# Patient Record
Sex: MALE | Race: BLACK OR AFRICAN AMERICAN | Employment: FULL TIME | URBAN - METROPOLITAN AREA
[De-identification: names, ages, dates, MRNs, and addresses within clinical notes are randomized per-mention and may not be internally consistent; named-entity substitution may affect disease eponyms.]

---

## 2019-09-04 PROBLEM — N52.9 MALE ERECTILE DYSFUNCTION, UNSPECIFIED: Status: ACTIVE | Noted: 2019-09-04

## 2020-12-23 ENCOUNTER — HOSPITAL ENCOUNTER (INPATIENT)
Age: 55
LOS: 4 days | Discharge: HOME | DRG: 177 | End: 2020-12-28
Attending: EMERGENCY MEDICINE | Admitting: FAMILY MEDICINE
Payer: COMMERCIAL

## 2020-12-23 ENCOUNTER — APPOINTMENT (OUTPATIENT)
Dept: GENERAL RADIOLOGY | Age: 55
DRG: 177 | End: 2020-12-23
Attending: EMERGENCY MEDICINE
Payer: COMMERCIAL

## 2020-12-23 DIAGNOSIS — U07.1 COVID-19 VIRUS INFECTION: Primary | ICD-10-CM

## 2020-12-23 DIAGNOSIS — R09.02 HYPOXIA: ICD-10-CM

## 2020-12-23 LAB
ALBUMIN SERPL-MCNC: 3.8 G/DL (ref 3.4–5)
ALBUMIN/GLOB SERPL: 0.9 {RATIO} (ref 0.8–1.7)
ALP SERPL-CCNC: 61 U/L (ref 45–117)
ALT SERPL-CCNC: 37 U/L (ref 16–61)
ANION GAP SERPL CALC-SCNC: 9 MMOL/L (ref 3–18)
APTT PPP: 32.6 SEC (ref 23–36.4)
ARTERIAL PATENCY WRIST A: YES
AST SERPL-CCNC: 41 U/L (ref 10–38)
BASE DEFICIT BLD-SCNC: 2 MMOL/L
BASOPHILS # BLD: 0 K/UL (ref 0–0.1)
BASOPHILS NFR BLD: 0 % (ref 0–2)
BDY SITE: ABNORMAL
BILIRUB SERPL-MCNC: 0.6 MG/DL (ref 0.2–1)
BNP SERPL-MCNC: 15 PG/ML (ref 0–900)
BODY TEMPERATURE: 103.2
BUN SERPL-MCNC: 17 MG/DL (ref 7–18)
BUN/CREAT SERPL: 13 (ref 12–20)
CALCIUM SERPL-MCNC: 8.7 MG/DL (ref 8.5–10.1)
CHLORIDE SERPL-SCNC: 103 MMOL/L (ref 100–111)
CO2 SERPL-SCNC: 25 MMOL/L (ref 21–32)
CREAT SERPL-MCNC: 1.35 MG/DL (ref 0.6–1.3)
DIFFERENTIAL METHOD BLD: NORMAL
EOSINOPHIL # BLD: 0 K/UL (ref 0–0.4)
EOSINOPHIL NFR BLD: 0 % (ref 0–5)
ERYTHROCYTE [DISTWIDTH] IN BLOOD BY AUTOMATED COUNT: 13.2 % (ref 11.6–14.5)
FIBRINOGEN PPP-MCNC: 481 MG/DL (ref 210–451)
GAS FLOW.O2 O2 DELIVERY SYS: ABNORMAL L/MIN
GLOBULIN SER CALC-MCNC: 4.3 G/DL (ref 2–4)
GLUCOSE SERPL-MCNC: 103 MG/DL (ref 74–99)
HCO3 BLD-SCNC: 22.6 MMOL/L (ref 22–26)
HCT VFR BLD AUTO: 42 % (ref 36–48)
HGB BLD-MCNC: 15.1 G/DL (ref 13–16)
INR PPP: 1 (ref 0.8–1.2)
LACTATE BLD-SCNC: 0.8 MMOL/L (ref 0.4–2)
LYMPHOCYTES # BLD: 2 K/UL (ref 0.9–3.6)
LYMPHOCYTES NFR BLD: 26 % (ref 21–52)
MAGNESIUM SERPL-MCNC: 2.2 MG/DL (ref 1.6–2.6)
MCH RBC QN AUTO: 31.3 PG (ref 24–34)
MCHC RBC AUTO-ENTMCNC: 36 G/DL (ref 31–37)
MCV RBC AUTO: 87.1 FL (ref 74–97)
MONOCYTES # BLD: 0.5 K/UL (ref 0.05–1.2)
MONOCYTES NFR BLD: 6 % (ref 3–10)
NEUTS SEG # BLD: 5.3 K/UL (ref 1.8–8)
NEUTS SEG NFR BLD: 68 % (ref 40–73)
O2/TOTAL GAS SETTING VFR VENT: 0.21 %
PCO2 BLD: 38.8 MMHG (ref 35–45)
PH BLD: 7.38 [PH] (ref 7.35–7.45)
PLATELET # BLD AUTO: 165 K/UL (ref 135–420)
PMV BLD AUTO: 10 FL (ref 9.2–11.8)
PO2 BLD: 74 MMHG (ref 80–100)
POTASSIUM SERPL-SCNC: 3.9 MMOL/L (ref 3.5–5.5)
PROT SERPL-MCNC: 8.1 G/DL (ref 6.4–8.2)
PROTHROMBIN TIME: 13.4 SEC (ref 11.5–15.2)
RBC # BLD AUTO: 4.82 M/UL (ref 4.7–5.5)
SAO2 % BLD: 92 % (ref 92–97)
SERVICE CMNT-IMP: ABNORMAL
SODIUM SERPL-SCNC: 137 MMOL/L (ref 136–145)
SPECIMEN TYPE: ABNORMAL
TOTAL RESP. RATE, ITRR: 28
WBC # BLD AUTO: 7.8 K/UL (ref 4.6–13.2)

## 2020-12-23 PROCEDURE — 82803 BLOOD GASES ANY COMBINATION: CPT

## 2020-12-23 PROCEDURE — 83605 ASSAY OF LACTIC ACID: CPT

## 2020-12-23 PROCEDURE — 85384 FIBRINOGEN ACTIVITY: CPT

## 2020-12-23 PROCEDURE — 83880 ASSAY OF NATRIURETIC PEPTIDE: CPT

## 2020-12-23 PROCEDURE — 96374 THER/PROPH/DIAG INJ IV PUSH: CPT

## 2020-12-23 PROCEDURE — 71045 X-RAY EXAM CHEST 1 VIEW: CPT

## 2020-12-23 PROCEDURE — 85610 PROTHROMBIN TIME: CPT

## 2020-12-23 PROCEDURE — 74011000250 HC RX REV CODE- 250: Performed by: EMERGENCY MEDICINE

## 2020-12-23 PROCEDURE — 99285 EMERGENCY DEPT VISIT HI MDM: CPT

## 2020-12-23 PROCEDURE — 85730 THROMBOPLASTIN TIME PARTIAL: CPT

## 2020-12-23 PROCEDURE — 83735 ASSAY OF MAGNESIUM: CPT

## 2020-12-23 PROCEDURE — 87040 BLOOD CULTURE FOR BACTERIA: CPT

## 2020-12-23 PROCEDURE — 80053 COMPREHEN METABOLIC PANEL: CPT

## 2020-12-23 PROCEDURE — 85025 COMPLETE CBC W/AUTO DIFF WBC: CPT

## 2020-12-23 PROCEDURE — 96375 TX/PRO/DX INJ NEW DRUG ADDON: CPT

## 2020-12-23 PROCEDURE — 83036 HEMOGLOBIN GLYCOSYLATED A1C: CPT

## 2020-12-23 PROCEDURE — 36600 WITHDRAWAL OF ARTERIAL BLOOD: CPT

## 2020-12-23 PROCEDURE — 93005 ELECTROCARDIOGRAM TRACING: CPT

## 2020-12-23 PROCEDURE — 74011250636 HC RX REV CODE- 250/636: Performed by: EMERGENCY MEDICINE

## 2020-12-23 PROCEDURE — 74011250637 HC RX REV CODE- 250/637: Performed by: EMERGENCY MEDICINE

## 2020-12-23 RX ORDER — ACETAMINOPHEN 500 MG
1000 TABLET ORAL ONCE
Status: COMPLETED | OUTPATIENT
Start: 2020-12-23 | End: 2020-12-23

## 2020-12-23 RX ORDER — SODIUM CHLORIDE 0.9 % (FLUSH) 0.9 %
5-10 SYRINGE (ML) INJECTION AS NEEDED
Status: DISCONTINUED | OUTPATIENT
Start: 2020-12-23 | End: 2020-12-28 | Stop reason: HOSPADM

## 2020-12-23 RX ORDER — DEXAMETHASONE SODIUM PHOSPHATE 10 MG/ML
10 INJECTION INTRAMUSCULAR; INTRAVENOUS
Status: COMPLETED | OUTPATIENT
Start: 2020-12-23 | End: 2020-12-23

## 2020-12-23 RX ORDER — DEXAMETHASONE SODIUM PHOSPHATE 100 MG/10ML
10 INJECTION INTRAMUSCULAR; INTRAVENOUS
Status: DISCONTINUED | OUTPATIENT
Start: 2020-12-23 | End: 2020-12-23

## 2020-12-23 RX ADMIN — AZITHROMYCIN MONOHYDRATE 500 MG: 500 INJECTION, POWDER, LYOPHILIZED, FOR SOLUTION INTRAVENOUS at 21:44

## 2020-12-23 RX ADMIN — ACETAMINOPHEN 1000 MG: 500 TABLET ORAL at 23:01

## 2020-12-23 RX ADMIN — SODIUM CHLORIDE 1000 ML: 900 INJECTION, SOLUTION INTRAVENOUS at 21:37

## 2020-12-23 RX ADMIN — DEXAMETHASONE SODIUM PHOSPHATE 10 MG: 10 INJECTION, SOLUTION INTRAMUSCULAR; INTRAVENOUS at 21:37

## 2020-12-23 RX ADMIN — WATER 2 G: 1 INJECTION INTRAMUSCULAR; INTRAVENOUS; SUBCUTANEOUS at 21:39

## 2020-12-24 PROBLEM — U07.1 COVID-19 VIRUS INFECTION: Status: ACTIVE | Noted: 2020-12-24

## 2020-12-24 PROBLEM — R09.02 HYPOXIA: Status: ACTIVE | Noted: 2020-12-24

## 2020-12-24 PROBLEM — E66.9 OBESITY (BMI 30-39.9): Status: ACTIVE | Noted: 2020-12-24

## 2020-12-24 LAB
25(OH)D3 SERPL-MCNC: 8.1 NG/ML (ref 30–100)
ABO + RH BLD: NORMAL
APTT PPP: 30.8 SEC (ref 23–36.4)
BLOOD GROUP ANTIBODIES SERPL: NORMAL
D DIMER PPP FEU-MCNC: 0.39 UG/ML(FEU)
FIBRINOGEN PPP-MCNC: 407 MG/DL (ref 210–451)
HBA1C MFR BLD: 6 % (ref 4.2–5.6)
INR PPP: 1.1 (ref 0.8–1.2)
L PNEUMO AG UR QL IA: NEGATIVE
MAGNESIUM SERPL-MCNC: 2.3 MG/DL (ref 1.6–2.6)
PROCALCITONIN SERPL-MCNC: 0.1 NG/ML
PROTHROMBIN TIME: 13.9 SEC (ref 11.5–15.2)
S PNEUM AG UR QL: NEGATIVE
SPECIMEN EXP DATE BLD: NORMAL
TROPONIN I SERPL-MCNC: <0.02 NG/ML (ref 0–0.04)

## 2020-12-24 PROCEDURE — 85384 FIBRINOGEN ACTIVITY: CPT

## 2020-12-24 PROCEDURE — 85610 PROTHROMBIN TIME: CPT

## 2020-12-24 PROCEDURE — 84484 ASSAY OF TROPONIN QUANT: CPT

## 2020-12-24 PROCEDURE — 82306 VITAMIN D 25 HYDROXY: CPT

## 2020-12-24 PROCEDURE — 85730 THROMBOPLASTIN TIME PARTIAL: CPT

## 2020-12-24 PROCEDURE — 36430 TRANSFUSION BLD/BLD COMPNT: CPT

## 2020-12-24 PROCEDURE — 74011250637 HC RX REV CODE- 250/637: Performed by: FAMILY MEDICINE

## 2020-12-24 PROCEDURE — 74011000258 HC RX REV CODE- 258: Performed by: INTERNAL MEDICINE

## 2020-12-24 PROCEDURE — 87449 NOS EACH ORGANISM AG IA: CPT

## 2020-12-24 PROCEDURE — XW033E5 INTRODUCTION OF REMDESIVIR ANTI-INFECTIVE INTO PERIPHERAL VEIN, PERCUTANEOUS APPROACH, NEW TECHNOLOGY GROUP 5: ICD-10-PCS | Performed by: INTERNAL MEDICINE

## 2020-12-24 PROCEDURE — 77010033678 HC OXYGEN DAILY

## 2020-12-24 PROCEDURE — 65660000000 HC RM CCU STEPDOWN

## 2020-12-24 PROCEDURE — 74011250636 HC RX REV CODE- 250/636: Performed by: FAMILY MEDICINE

## 2020-12-24 PROCEDURE — 85379 FIBRIN DEGRADATION QUANT: CPT

## 2020-12-24 PROCEDURE — 74011000250 HC RX REV CODE- 250: Performed by: INTERNAL MEDICINE

## 2020-12-24 PROCEDURE — XW13325 TRANSFUSION OF CONVALESCENT PLASMA (NONAUTOLOGOUS) INTO PERIPHERAL VEIN, PERCUTANEOUS APPROACH, NEW TECHNOLOGY GROUP 5: ICD-10-PCS | Performed by: FAMILY MEDICINE

## 2020-12-24 PROCEDURE — 84145 PROCALCITONIN (PCT): CPT

## 2020-12-24 PROCEDURE — 83735 ASSAY OF MAGNESIUM: CPT

## 2020-12-24 PROCEDURE — 86901 BLOOD TYPING SEROLOGIC RH(D): CPT

## 2020-12-24 RX ORDER — ACETAMINOPHEN 650 MG/1
650 SUPPOSITORY RECTAL
Status: DISCONTINUED | OUTPATIENT
Start: 2020-12-24 | End: 2020-12-28 | Stop reason: HOSPADM

## 2020-12-24 RX ORDER — ACETAMINOPHEN 325 MG/1
650 TABLET ORAL
Status: DISCONTINUED | OUTPATIENT
Start: 2020-12-24 | End: 2020-12-28 | Stop reason: HOSPADM

## 2020-12-24 RX ORDER — SODIUM CHLORIDE 0.9 % (FLUSH) 0.9 %
5-40 SYRINGE (ML) INJECTION AS NEEDED
Status: DISCONTINUED | OUTPATIENT
Start: 2020-12-24 | End: 2020-12-28 | Stop reason: HOSPADM

## 2020-12-24 RX ORDER — POLYETHYLENE GLYCOL 3350 17 G/17G
17 POWDER, FOR SOLUTION ORAL DAILY PRN
Status: DISCONTINUED | OUTPATIENT
Start: 2020-12-24 | End: 2020-12-28 | Stop reason: HOSPADM

## 2020-12-24 RX ORDER — SODIUM CHLORIDE 9 MG/ML
250 INJECTION, SOLUTION INTRAVENOUS AS NEEDED
Status: DISCONTINUED | OUTPATIENT
Start: 2020-12-24 | End: 2020-12-28 | Stop reason: HOSPADM

## 2020-12-24 RX ORDER — GUAIFENESIN 100 MG/5ML
81 LIQUID (ML) ORAL DAILY
Status: DISCONTINUED | OUTPATIENT
Start: 2020-12-24 | End: 2020-12-28 | Stop reason: HOSPADM

## 2020-12-24 RX ORDER — GUAIFENESIN/DEXTROMETHORPHAN 100-10MG/5
5 SYRUP ORAL
Status: DISCONTINUED | OUTPATIENT
Start: 2020-12-24 | End: 2020-12-28 | Stop reason: HOSPADM

## 2020-12-24 RX ORDER — DEXAMETHASONE 4 MG/1
6 TABLET ORAL DAILY
Status: DISCONTINUED | OUTPATIENT
Start: 2020-12-24 | End: 2020-12-28 | Stop reason: HOSPADM

## 2020-12-24 RX ORDER — PROMETHAZINE HYDROCHLORIDE 25 MG/1
12.5 TABLET ORAL
Status: DISCONTINUED | OUTPATIENT
Start: 2020-12-24 | End: 2020-12-28 | Stop reason: HOSPADM

## 2020-12-24 RX ORDER — ENOXAPARIN SODIUM 100 MG/ML
30 INJECTION SUBCUTANEOUS EVERY 12 HOURS
Status: DISCONTINUED | OUTPATIENT
Start: 2020-12-24 | End: 2020-12-28 | Stop reason: HOSPADM

## 2020-12-24 RX ORDER — CHOLECALCIFEROL (VITAMIN D3) 125 MCG
10 CAPSULE ORAL
Status: DISCONTINUED | OUTPATIENT
Start: 2020-12-24 | End: 2020-12-28 | Stop reason: HOSPADM

## 2020-12-24 RX ORDER — ZINC SULFATE 50(220)MG
1 CAPSULE ORAL EVERY 12 HOURS
Status: DISCONTINUED | OUTPATIENT
Start: 2020-12-24 | End: 2020-12-28 | Stop reason: HOSPADM

## 2020-12-24 RX ORDER — ASCORBIC ACID 250 MG
500 TABLET ORAL 2 TIMES DAILY
Status: DISCONTINUED | OUTPATIENT
Start: 2020-12-24 | End: 2020-12-28 | Stop reason: HOSPADM

## 2020-12-24 RX ORDER — SODIUM CHLORIDE 0.9 % (FLUSH) 0.9 %
5-40 SYRINGE (ML) INJECTION EVERY 8 HOURS
Status: DISCONTINUED | OUTPATIENT
Start: 2020-12-24 | End: 2020-12-28 | Stop reason: HOSPADM

## 2020-12-24 RX ORDER — MELATONIN
6000 DAILY
Status: DISCONTINUED | OUTPATIENT
Start: 2020-12-24 | End: 2020-12-28 | Stop reason: HOSPADM

## 2020-12-24 RX ORDER — ONDANSETRON 2 MG/ML
4 INJECTION INTRAMUSCULAR; INTRAVENOUS
Status: DISCONTINUED | OUTPATIENT
Start: 2020-12-24 | End: 2020-12-28 | Stop reason: HOSPADM

## 2020-12-24 RX ORDER — FAMOTIDINE 20 MG/1
20 TABLET, FILM COATED ORAL DAILY
Status: DISCONTINUED | OUTPATIENT
Start: 2020-12-24 | End: 2020-12-28 | Stop reason: HOSPADM

## 2020-12-24 RX ADMIN — Medication 10 ML: at 22:58

## 2020-12-24 RX ADMIN — REMDESIVIR 200 MG: 100 INJECTION, POWDER, LYOPHILIZED, FOR SOLUTION INTRAVENOUS at 12:04

## 2020-12-24 RX ADMIN — Medication 5 ML: at 14:00

## 2020-12-24 RX ADMIN — Medication 10 MG: at 22:57

## 2020-12-24 RX ADMIN — ACETAMINOPHEN 650 MG: 325 TABLET ORAL at 23:12

## 2020-12-24 RX ADMIN — DEXAMETHASONE 6 MG: 4 TABLET ORAL at 08:30

## 2020-12-24 RX ADMIN — Medication 10 MG: at 04:40

## 2020-12-24 RX ADMIN — Medication 10 ML: at 04:40

## 2020-12-24 RX ADMIN — ACETAMINOPHEN 650 MG: 325 TABLET ORAL at 04:39

## 2020-12-24 RX ADMIN — Medication 500 MG: at 04:39

## 2020-12-24 RX ADMIN — ENOXAPARIN SODIUM 30 MG: 30 INJECTION SUBCUTANEOUS at 04:39

## 2020-12-24 RX ADMIN — ZINC SULFATE 220 MG (50 MG) CAPSULE 1 CAPSULE: CAPSULE at 22:57

## 2020-12-24 RX ADMIN — ZINC SULFATE 220 MG (50 MG) CAPSULE 1 CAPSULE: CAPSULE at 04:40

## 2020-12-24 RX ADMIN — Medication 500 MG: at 22:57

## 2020-12-24 RX ADMIN — Medication 500 MG: at 08:30

## 2020-12-24 RX ADMIN — ZINC SULFATE 220 MG (50 MG) CAPSULE 1 CAPSULE: CAPSULE at 08:30

## 2020-12-24 RX ADMIN — Medication 6 TABLET: at 08:29

## 2020-12-24 RX ADMIN — FAMOTIDINE 20 MG: 20 TABLET, FILM COATED ORAL at 08:30

## 2020-12-24 RX ADMIN — ASPIRIN 81 MG: 81 TABLET, CHEWABLE ORAL at 08:29

## 2020-12-24 RX ADMIN — ENOXAPARIN SODIUM 30 MG: 30 INJECTION SUBCUTANEOUS at 17:02

## 2020-12-24 NOTE — PROGRESS NOTES
Problem: Falls - Risk of  Goal: *Absence of Falls  Description: Document Telly Gosia Fall Risk and appropriate interventions in the flowsheet. Outcome: Progressing Towards Goal  Note: Fall Risk Interventions:            Medication Interventions: Teach patient to arise slowly                   Problem: Patient Education: Go to Patient Education Activity  Goal: Patient/Family Education  Outcome: Progressing Towards Goal     Problem: Airway Clearance - Ineffective  Goal: Achieve or maintain patent airway  Outcome: Progressing Towards Goal     Problem: Gas Exchange - Impaired  Goal: Absence of hypoxia  Outcome: Progressing Towards Goal  Goal: Promote optimal lung function  Outcome: Progressing Towards Goal     Problem: Breathing Pattern - Ineffective  Goal: Ability to achieve and maintain a regular respiratory rate  Outcome: Progressing Towards Goal     Problem:  Body Temperature -  Risk of, Imbalanced  Goal: Ability to maintain a body temperature within defined limits  Outcome: Progressing Towards Goal  Goal: Will regain or maintain usual level of consciousness  Outcome: Progressing Towards Goal  Goal: Complications related to the disease process, condition or treatment will be avoided or minimized  Outcome: Progressing Towards Goal     Problem: Isolation Precautions - Risk of Spread of Infection  Goal: Prevent transmission of infectious organism to others  Outcome: Progressing Towards Goal     Problem: Nutrition Deficits  Goal: Optimize nutrtional status  Outcome: Progressing Towards Goal     Problem: Risk for Fluid Volume Deficit  Goal: Maintain normal heart rhythm  Outcome: Progressing Towards Goal  Goal: Maintain absence of muscle cramping  Outcome: Progressing Towards Goal  Goal: Maintain normal serum potassium, sodium, calcium, phosphorus, and pH  Outcome: Progressing Towards Goal     Problem: Loneliness or Risk for Loneliness  Goal: Demonstrate positive use of time alone when socialization is not possible  Outcome: Progressing Towards Goal     Problem: Fatigue  Goal: Verbalize increase energy and improved vitality  Outcome: Progressing Towards Goal     Problem: Patient Education: Go to Patient Education Activity  Goal: Patient/Family Education  Outcome: Progressing Towards Goal     Problem: General Medical Care Plan  Goal: *Vital signs within specified parameters  Outcome: Progressing Towards Goal  Goal: *Labs within defined limits  Outcome: Progressing Towards Goal  Goal: *Absence of infection signs and symptoms  Outcome: Progressing Towards Goal  Goal: *Optimal pain control at patient's stated goal  Outcome: Progressing Towards Goal  Goal: *Skin integrity maintained  Outcome: Progressing Towards Goal  Goal: *Fluid volume balance  Outcome: Progressing Towards Goal  Goal: *Optimize nutritional status  Outcome: Progressing Towards Goal  Goal: *Anxiety reduced or absent  Outcome: Progressing Towards Goal  Goal: *Progressive mobility and function (eg: ADL's)  Outcome: Progressing Towards Goal     Problem: Patient Education: Go to Patient Education Activity  Goal: Patient/Family Education  Outcome: Progressing Towards Goal     Problem: Pain  Goal: *Control of Pain  Outcome: Progressing Towards Goal  Goal: *PALLIATIVE CARE:  Alleviation of Pain  Outcome: Progressing Towards Goal     Problem: Patient Education: Go to Patient Education Activity  Goal: Patient/Family Education  Outcome: Progressing Towards Goal     Problem:  Activity Intolerance  Goal: *Oxygen saturation during activity within specified parameters  Outcome: Progressing Towards Goal  Goal: *Able to remain out of bed as prescribed  Outcome: Progressing Towards Goal     Problem: Patient Education: Go to Patient Education Activity  Goal: Patient/Family Education  Outcome: Progressing Towards Goal     Problem: Breathing Pattern - Ineffective  Goal: *Absence of hypoxia  Outcome: Progressing Towards Goal  Goal: *Use of effective breathing techniques  Outcome: Progressing Towards Goal  Goal: *PALLIATIVE CARE:  Alleviation of Dyspnea  Outcome: Progressing Towards Goal     Problem: Patient Education: Go to Patient Education Activity  Goal: Patient/Family Education  Outcome: Progressing Towards Goal

## 2020-12-24 NOTE — PROGRESS NOTES
0059: TRANSFER - IN REPORT:    Verbal report received from TETO Pickard RN (name) on Jackelyn Nicholson  being received from ED (unit) for routine progression of care      Report consisted of patients Situation, Background, Assessment and   Recommendations(SBAR). Information from the following report(s) SBAR, Kardex, Procedure Summary, Intake/Output, MAR and Recent Results was reviewed with the receiving nurse. Opportunity for questions and clarification was provided. Assessment completed upon patients arrival to unit and care assumed.        0300: Report given to oncoming nurse [address] RN

## 2020-12-24 NOTE — ED NOTES
TRANSFER - OUT REPORT:    Verbal report given to Suresh RN (name) on Carl Rodriguez  being transferred to Telemetry (unit) for routine progression of care       Report consisted of patients Situation, Background, Assessment and   Recommendations(SBAR). Information from the following report(s) SBAR, Kardex, ED Summary, [facility] and Cardiac Rhythm NSR was reviewed with the receiving nurse. Lines:   Peripheral IV 12/23/20 Right Antecubital (Active)        Opportunity for questions and clarification was provided.       Patient transported with:   Monitor

## 2020-12-24 NOTE — ED NOTES
Xray tech informed RN that patient is being noncompliant and keeps removing his mask. Xray tech states patient elbowed him in the ribs. Charge nurse notified.

## 2020-12-24 NOTE — CONSULTS
[facility]  (A Division of [address] [place] [address])      Consultation Note      Date of Admission: 12/23/2020    Date of Consultation: 12/24/2020    Referred by: Tomy Jones MD    Reason for Referral: COVID-19      Current Antimicrobials:    Prior Antimicrobials:  1. Remdesivir IV (12/24-) #0  2. Azithro IV (12/23-) #1  3. CAX IV (12/23-) #1 none       Assessment: Rec / Plan:   [id number]  12/24:  PCT 0.10ng/mL    High risk for progression. Will give remdesivir/steroids/convalescent plasma; he has consented to plasma after describing risks/benefits. ->Steroids #1/10      Push on.   Obesity BMI 37                      Microbiology:  12/24 - Legionella/Spneumo Ag(-)     12/23 - BCx x2 (-)      Lines / Catheters: peripheral      HPI:  CC:  \"I'm weak\"  Mr Brigida Friedman is a 55y obese/diabetic AAM who was in usual state of health until approx a week ago while he was on a business trip to [facility]; admitted briefly overnight to a hospital up there with COVID-19. His wife drove up there to fetch him and brought him back yesterday where he was admitted through ED here. Has had progressive dry cough/dyspnea for past week. Diarrhea from yesterday with anosmia. Feels MUCH better overnight (steroids).     12/20 - COVID-19 (+)  Steroids (12/23-)  Remdesivir (12/24-)  ABO  A+           Active Hospital Problems    Diagnosis Date Noted    Hypoxia 12/24/2020    COVID-19 virus infection 12/24/2020    Obesity (BMI 30-39.9) 12/24/2020     Past Medical History:   Diagnosis Date    Obesity (BMI 30-39. 9)      History reviewed. No pertinent surgical history.   Family History   Problem Relation Age of Onset    Heart Disease Mother     Diabetes Mother     Diabetes Sister     Stroke Sister      Social History     Socioeconomic History    Marital status:      Spouse name: Not on file    Number of children: Not on file    Years of education: Not on file   Dossie Tana Highest education level: Not on file   Occupational History    Not on file   Social Needs    Financial resource strain: Not on file    Food insecurity     Worry: Not on file     Inability: Not on file    Transportation needs     Medical: Not on file     Non-medical: Not on file   Tobacco Use    Smoking status: Never Smoker    Smokeless tobacco: Current User   Substance and Sexual Activity    Alcohol use: Yes     Alcohol/week: 2.0 standard drinks     Types: 2 Cans of beer per week    Drug use: Not on file    Sexual activity: Not on file   Lifestyle    Physical activity     Days per week: Not on file     Minutes per session: Not on file    Stress: Not on file   Relationships    Social connections     Talks on phone: Not on file     Gets together: Not on file     Attends Restoration service: Not on file     Active member of club or organization: Not on file     Attends meetings of clubs or organizations: Not on file     Relationship status: Not on file    Intimate partner violence     Fear of current or ex partner: Not on file     Emotionally abused: Not on file     Physically abused: Not on file     Forced sexual activity: Not on file   Other Topics Concern     Service Not Asked    Blood Transfusions Not Asked    Caffeine Concern Not Asked    Occupational Exposure Not Asked   Wall Border Hazards Not Asked    Sleep Concern Not Asked    Stress Concern Not Asked    Weight Concern Not Asked    Special Diet Not Asked    Back Care Not Asked    Exercise Not Asked    Bike Helmet Not Asked   [place] Not Asked    Self-Exams Not Asked   Social History Narrative    Not on file       Allergies:  Patient has no known allergies.      Medications:  Current Facility-Administered Medications   Medication Dose Route Frequency    0.9% sodium chloride infusion 250 mL  250 mL IntraVENous PRN    sodium chloride (NS) flush 5-40 mL  5-40 mL IntraVENous Q8H    sodium chloride (NS) flush 5-40 mL  5-40 mL IntraVENous PRN    acetaminophen (TYLENOL) tablet 650 mg  650 mg Oral Q6H PRN    Or    acetaminophen (TYLENOL) suppository 650 mg  650 mg Rectal Q6H PRN    polyethylene glycol (MIRALAX) packet 17 g  17 g Oral DAILY PRN    promethazine (PHENERGAN) tablet 12.5 mg  12.5 mg Oral Q6H PRN    Or    ondansetron (ZOFRAN) injection 4 mg  4 mg IntraVENous Q6H PRN    enoxaparin (LOVENOX) injection 30 mg  30 mg SubCUTAneous Q12H    dexAMETHasone (DECADRON) tablet 6 mg  6 mg Oral DAILY    cholecalciferol (VITAMIN D3) (1000 Units /25 mcg) tablet 6 Tab  6,000 Units Oral DAILY    guaiFENesin-dextromethorphan (ROBITUSSIN DM) 100-10 mg/5 mL syrup 5 mL  5 mL Oral Q4H PRN    ascorbic acid (vitamin C) (VITAMIN C) tablet 500 mg  500 mg Oral BID    zinc sulfate (ZINCATE) 220 (50) mg capsule 1 Cap  1 Cap Oral Q12H    melatonin tablet 10 mg  10 mg Oral QHS    famotidine (PEPCID) tablet 20 mg  20 mg Oral DAILY    aspirin chewable tablet 81 mg  81 mg Oral DAILY    [START ON 2020] remdesivir 100 mg in 0.9% sodium chloride 250 mL IVPB  100 mg IntraVENous Q24H    sodium chloride (NS) flush 5-10 mL  5-10 mL IntraVENous PRN        ROS:  Constitutional: positive for fevers, chills, fatigue and malaise, negative for weight loss  Ears, Nose, Mouth, Throat, and Face: positive for anosmia  Respiratory: positive for cough or dyspnea on exertion, negative for sputum or hemoptysis  Cardiovascular: positive for dyspnea, fatigue, dyspnea on exertion, negative for chest pain  Gastrointestinal: positive for diarrhea, negative for nausea, vomiting and abdominal pain  Genitourinary:negative for dysuria     Physical Exam:    HPI:  Temp (24hrs), Av.5 °F (36.9 °C), Min:97.1 °F (36.2 °C), Max:101.6 °F (38.7 °C)    Visit Vitals  /83   Pulse 65   Temp 97.5 °F (36.4 °C)   Resp 16   Ht 6' (1.829 m)   Wt 126.1 kg (278 lb)   SpO2 100%   BMI 37.70 kg/m²       General: Well developed, well nourished 54 y.o.  BLACK OR  male in no acute distress.  ENT: ENT exam normal, no neck nodes or sinus tenderness  Head: normocephalic, without obvious abnormality  Mouth:  mucous membranes moist, pharynx normal without lesions  Neck: supple, symmetrical, trachea midline   Cardio:  regular rate and rhythm, S1, S2 normal, no murmur, click, rub or gallop  Chest: inspection normal - no chest wall deformities or tenderness, respiratory effort normal  Lungs: clear to auscultation, no wheezes or rales and unlabored breathing  Abdomen: soft, non-tender. Bowel sounds normal. No masses, no organomegaly.  Extremities:  no redness or tenderness in the calves or thighs, no edema  Neuro: Grossly normal       Lab results:    Chemistry  Recent Labs     12/23/20 2115   *      K 3.9      CO2 25   BUN 17   CREA 1.35*   CA 8.7   AGAP 9   BUCR 13   AP 61   TP 8.1   ALB 3.8   GLOB 4.3*   AGRAT 0.9       CBC w/ Diff  Recent Labs     12/23/20 2115   WBC 7.8   RBC 4.82   HGB 15.1   HCT 42.0      GRANS 68   LYMPH 26   EOS 0       Microbiology  All Micro Results     Procedure Component Value Units Date/Time    STREP PNEUMO AG, URINE [[id number]] Collected: 12/24/20 0318    Order Status: Completed Specimen: Urine, random Updated: 12/24/20 1031     Strep pneumo Ag, urine Negative       LEGIONELLA PNEUMOPHILA AG, URINE [[id number]] Collected: 12/24/20 0318    Order Status: Completed Specimen: Urine, random Updated: 12/24/20 1031     Legionella Ag, urine Negative       CULTURE, BLOOD [[id number]] Collected: 12/23/20 2100    Order Status: Completed Specimen: Blood Updated: 12/24/20 0645     Special Requests: NO SPECIAL REQUESTS        Culture result: NO GROWTH AFTER 9 HOURS       CULTURE, BLOOD [[id number]] Collected: 12/23/20 2115    Order Status: Completed Specimen: Blood Updated: 12/24/20 0645     Special Requests: NO SPECIAL REQUESTS        Culture result: NO GROWTH AFTER 9 HOURS              Mushtaq Boland MD  Cell [phone]  [facility]  12/24/2020   12:40 PM

## 2020-12-24 NOTE — PROGRESS NOTES
Reason for Admission:   Chart reviewed; per H&P, patient is \"          a 54 y.o. male who has obesity who presents to the ED with worsening shortness of breath today in the setting of a COVID-19 infection. He started having symptoms initially on 12/18. He got the infection from his coworker who was asymptomatic. His coworker's brother gave it to his coworker over a Thanksgiving get-together. He was admitted on 12/20 overnight in [address] (was there for work) but was not hypoxic so did not receive any treatment. He lives locally so he drove back home yesterday after being discharged. He has had cough, fever, and abdominal discomfort. \"     RUR Score:       Low 10%              Plan for utilizing home health:      TBD    PCP: First and Last name:  Dr. Amanda Joy   Name of Practice: urology   Are you a current patient: Yes/No: yes   Approximate date of last visit: \"before covid\"   Can you participate in a virtual visit with your PCP: yes                    Current Advanced Directive/Advance Care Plan: full code, no ACP on record                         Transition of Care Plan:       Spoke with patient virtually via phone; per patient he has no regular PCP but has seen Dr. Amanda Joy for an enlarged prostate; has been independent and lives w/ wife who flew up to [address] and drove him back when he became ill. Just finished his first dose of remdesivir and getting ready to start convalescent plasma. Anticipate he will be in facility at least until Monday and care management will continue to follow for discharge needs. Care Management Interventions  PCP Verified by CM:  Yes  Mode of Transport at Discharge: Self  Transition of Care Consult (CM Consult): Discharge Planning  Current Support Network: Lives with Spouse, Own Home  Confirm Follow Up Transport: Family  The Plan for Transition of Care is Related to the Following Treatment Goals : home when medically stable  The Patient and/or Patient Representative was Provided with a Choice of Provider and Agrees with the Discharge Plan?: Yes  Name of the Patient Representative Who was Provided with a Choice of Provider and Agrees with the Discharge Plan: Jaye Hardwick, patient  Discharge Location  Discharge Placement: Home

## 2020-12-24 NOTE — ED TRIAGE NOTES
Patient reports to ED with chief complaint of generalized body aches for the past 5 days.  Reports positive COVID-19 diagnosis, along with diagnosis of pneumonia

## 2020-12-24 NOTE — ED NOTES
Patient aware of urine sample needed. Unable to void at current time. Will re attempt to collect at a later time.

## 2020-12-24 NOTE — PROGRESS NOTES
2732:  Assumed care for patient, received verbal report from Fili Sevilla RN. Patient quietly lying in bed with eyes closed, chest slowly rising and falling. Patient has complaints of a headache, 8/10. Will administer PRN after assessment. Whiteboard updated, bed at the lowest position with call bell within reach. [facility] Night Shift Chart Audit    Chart Audit completed? YES. Shift uneventful. Verbal shift change report given to Vanita Luna RN (oncoming nurse) by Richard Bryan RN   (offgoing nurse). Report included the following information SBAR, Kardex, ED Summary, Procedure Summary, Intake/Output, MAR, Recent Results, Med Rec Status, Cardiac Rhythm SR and Alarm Parameters .

## 2020-12-24 NOTE — PROGRESS NOTES
Problem: Falls - Risk of  Goal: *Absence of Falls  Description: Document Kristen Aiken Fall Risk and appropriate interventions in the flowsheet. Outcome: Progressing Towards Goal  Note: Fall Risk Interventions:            Medication Interventions: Teach patient to arise slowly, Patient to call before getting OOB, Evaluate medications/consider consulting pharmacy                   Problem: Patient Education: Go to Patient Education Activity  Goal: Patient/Family Education  Outcome: Progressing Towards Goal     Problem: Airway Clearance - Ineffective  Goal: Achieve or maintain patent airway  Outcome: Progressing Towards Goal     Problem: Gas Exchange - Impaired  Goal: Absence of hypoxia  Outcome: Progressing Towards Goal  Goal: Promote optimal lung function  Outcome: Progressing Towards Goal     Problem: Breathing Pattern - Ineffective  Goal: Ability to achieve and maintain a regular respiratory rate  Outcome: Progressing Towards Goal     Problem:  Body Temperature -  Risk of, Imbalanced  Goal: Ability to maintain a body temperature within defined limits  Outcome: Progressing Towards Goal  Goal: Will regain or maintain usual level of consciousness  Outcome: Progressing Towards Goal  Goal: Complications related to the disease process, condition or treatment will be avoided or minimized  Outcome: Progressing Towards Goal     Problem: Isolation Precautions - Risk of Spread of Infection  Goal: Prevent transmission of infectious organism to others  Outcome: Progressing Towards Goal     Problem: Nutrition Deficits  Goal: Optimize nutrtional status  Outcome: Progressing Towards Goal     Problem: Risk for Fluid Volume Deficit  Goal: Maintain normal heart rhythm  Outcome: Progressing Towards Goal  Goal: Maintain absence of muscle cramping  Outcome: Progressing Towards Goal  Goal: Maintain normal serum potassium, sodium, calcium, phosphorus, and pH  Outcome: Progressing Towards Goal     Problem: Loneliness or Risk for Loneliness  Goal: Demonstrate positive use of time alone when socialization is not possible  Outcome: Progressing Towards Goal     Problem: Fatigue  Goal: Verbalize increase energy and improved vitality  Outcome: Progressing Towards Goal     Problem: Patient Education: Go to Patient Education Activity  Goal: Patient/Family Education  Outcome: Progressing Towards Goal     Problem: General Medical Care Plan  Goal: *Vital signs within specified parameters  Outcome: Progressing Towards Goal  Goal: *Labs within defined limits  Outcome: Progressing Towards Goal  Goal: *Absence of infection signs and symptoms  Outcome: Progressing Towards Goal  Goal: *Optimal pain control at patient's stated goal  Outcome: Progressing Towards Goal  Goal: *Skin integrity maintained  Outcome: Progressing Towards Goal  Goal: *Fluid volume balance  Outcome: Progressing Towards Goal  Goal: *Optimize nutritional status  Outcome: Progressing Towards Goal  Goal: *Anxiety reduced or absent  Outcome: Progressing Towards Goal  Goal: *Progressive mobility and function (eg: ADL's)  Outcome: Progressing Towards Goal     Problem: Patient Education: Go to Patient Education Activity  Goal: Patient/Family Education  Outcome: Progressing Towards Goal     Problem: Pain  Goal: *Control of Pain  Outcome: Progressing Towards Goal  Goal: *PALLIATIVE CARE:  Alleviation of Pain  Outcome: Progressing Towards Goal     Problem: Patient Education: Go to Patient Education Activity  Goal: Patient/Family Education  Outcome: Progressing Towards Goal     Problem:  Activity Intolerance  Goal: *Oxygen saturation during activity within specified parameters  Outcome: Progressing Towards Goal  Goal: *Able to remain out of bed as prescribed  Outcome: Progressing Towards Goal     Problem: Patient Education: Go to Patient Education Activity  Goal: Patient/Family Education  Outcome: Progressing Towards Goal     Problem: Breathing Pattern - Ineffective  Goal: *Absence of hypoxia  Outcome: Progressing Towards Goal  Goal: *Use of effective breathing techniques  Outcome: Progressing Towards Goal  Goal: *PALLIATIVE CARE:  Alleviation of Dyspnea  Outcome: Progressing Towards Goal     Problem: Patient Education: Go to Patient Education Activity  Goal: Patient/Family Education  Outcome: Progressing Towards Goal

## 2020-12-24 NOTE — H&P
History & Physical    Patient: Francisco Griffin MRN: [id number]  CSN: [id number]    YOB: 1965  Age: 54 y.o. Sex: male      DOA: 12/23/2020  Primary Care Provider:  Referred, Self, MD      Assessment/Plan     Patient Active Problem List   Diagnosis Code    Male erectile dysfunction, unspecified N52.9    Hypoxia R09.02    COVID-19 virus infection U07.1    Obesity (BMI 30-39. 9) E66.9     53 y/o male with obesity admitted for COVID-19 infection with hypoxia. COVID-19 infection  -admit to isolation unit  -nasal cannula oxygen  -prone positioning as tolerated  -dexamethasone  -antioxidant cocktail  -daily ASA  -daily COVID labs  -convalescent plasma (consent signed)  -ID consult for consideration of remdesivir    Obesity-comorbid condition increasing hospitalization rate with COVID infection  -aggressive lifestyle intervention needed  -follow up hemoglobin A1C to screen for diabetes    Full code    Prophylaxis: pepcid PO, lovenox SQ    Estimated length of stay : 3-4 nights    Oscar nAthony MD  Nocturnist  ----------------------------------------------------------------------------------------------------------------------------------------------------------------------------------------------------------------  CC: COVID infection       HPI:     Francisco Griffin is a 54 y.o. male who has obesity who presents to the ED with worsening shortness of breath today in the setting of a COVID-19 infection. He started having symptoms initially on 12/18. He got the infection from his coworker who was asymptomatic. His coworker's brother gave it to his coworker over a Thanksgiving get-together. He was admitted on 12/20 overnight in [place], [place] (was there for work) but was not hypoxic so did not receive any treatment. He lives locally so he drove back home yesterday after being discharged. He has had cough, fever, and abdominal discomfort. Past Medical History:   Diagnosis Date    Obesity (BMI 30-39. 9) History reviewed. No pertinent surgical history. Family History   Problem Relation Age of Onset    Heart Disease Mother     Diabetes Mother     Diabetes Sister     Stroke Sister        Social History     Socioeconomic History    Marital status:      Spouse name: Not on file    Number of children: Not on file    Years of education: Not on file    Highest education level: Not on file   Tobacco Use    Smoking status: Never Smoker    Smokeless tobacco: Current User   Substance and Sexual Activity    Alcohol use: Yes     Alcohol/week: 2.0 standard drinks     Types: 2 Cans of beer per week   Other Topics Concern       Prior to Admission medications    Not on File       No Known Allergies    Review of Systems  Gen: +fever, chills, malaise  Heent: No headache, rhinorrhea, sore throat. Heart: No chest pain, palpitations, LANDERS, pnd, or orthopnea. Resp: +cough, wheezing and shortness of breath. GI: No nausea, vomiting, diarrhea,   : No urinary obstruction, dysuria or hematuria. Derm: No rash, new skin lesion or pruritis. Musc/skeletal: no bone or joint complaints. Vasc: No edema, cyanosis or claudication. Endo: No heat/cold intolerance, no polyuria,polydipsia or polyphagia. Neuro: No unilateral weakness, numbness, tingling. No seizures. Heme: No easy bruising or bleeding. Physical Exam:     Physical Exam:  Visit Vitals  /82 (BP 1 Location: Right arm, BP Patient Position: At rest;Sitting)   Pulse 87   Temp 98.7 °F (37.1 °C)   Resp 20   Ht 6' (1.829 m)   Wt 126.1 kg (278 lb)   SpO2 95%   BMI 37.70 kg/m²    O2 Flow Rate (L/min): 2 l/min O2 Device: Nasal cannula    Temp (24hrs), Av.1 °F (37.3 °C), Min:97.1 °F (36.2 °C), Max:101.6 °F (38.7 °C)     1901 -  0700  In: 1000 [I.V.:1000]  Out: -    No intake/output data recorded. General:  Awake, ill-appearing, mildly tachypneic, cooperative. Head:  Normocephalic, without obvious abnormality, atraumatic. Eyes:  Conjunctivae/corneas clear, sclera anicteric. Neck: Supple, symmetrical, trachea midline. Lungs:   Coarse breath sounds in right middle lung, no wheezing. Heart:  Regular rate and rhythm, S1, S2 normal, no murmur, click, rub or gallop. Abdomen: Soft, non-tender. Bowel sounds normal. No masses,  No organomegaly. Extremities: Extremities normal, atraumatic, no cyanosis or edema. Capillary refill normal.   Pulses: 2+ and symmetric all extremities. Skin: Skin color pink, turgor normal. No rashes or lesions   Neurologic: No focal motor or sensory deficit. Labs Reviewed: All lab results for the last 24 hours reviewed. Recent Results (from the past 24 hour(s))   MAGNESIUM    Collection Time: 12/23/20  9:15 PM   Result Value Ref Range    Magnesium 2.2 1.6 - 2.6 mg/dL   PROTHROMBIN TIME + INR    Collection Time: 12/23/20  9:15 PM   Result Value Ref Range    Prothrombin time 13.4 11.5 - 15.2 sec    INR 1.0 0.8 - 1.2     PTT    Collection Time: 12/23/20  9:15 PM   Result Value Ref Range    aPTT 32.6 23.0 - 36.4 SEC   FIBRINOGEN    Collection Time: 12/23/20  9:15 PM   Result Value Ref Range    Fibrinogen 481 (H) 210 - 086 mg/dL   METABOLIC PANEL, COMPREHENSIVE    Collection Time: 12/23/20  9:15 PM   Result Value Ref Range    Sodium 137 136 - 145 mmol/L    Potassium 3.9 3.5 - 5.5 mmol/L    Chloride 103 100 - 111 mmol/L    CO2 25 21 - 32 mmol/L    Anion gap 9 3.0 - 18 mmol/L    Glucose 103 (H) 74 - 99 mg/dL    BUN 17 7.0 - 18 MG/DL    Creatinine 1.35 (H) 0.6 - 1.3 MG/DL    BUN/Creatinine ratio 13 12 - 20      GFR est AA >60 >60 ml/min/1.73m2    GFR est non-AA 55 (L) >60 ml/min/1.73m2    Calcium 8.7 8.5 - 10.1 MG/DL    Bilirubin, total 0.6 0.2 - 1.0 MG/DL    ALT (SGPT) 37 16 - 61 U/L    AST (SGOT) 41 (H) 10 - 38 U/L    Alk.  phosphatase 61 45 - 117 U/L    Protein, total 8.1 6.4 - 8.2 g/dL    Albumin 3.8 3.4 - 5.0 g/dL    Globulin 4.3 (H) 2.0 - 4.0 g/dL    A-G Ratio 0.9 0.8 - 1.7     CBC WITH AUTOMATED DIFF    Collection Time: 12/23/20  9:15 PM   Result Value Ref Range    WBC 7.8 4.6 - 13.2 K/uL    RBC 4.82 4.70 - 5.50 M/uL    HGB 15.1 13.0 - 16.0 g/dL    HCT 42.0 36.0 - 48.0 %    MCV 87.1 74.0 - 97.0 FL    MCH 31.3 24.0 - 34.0 PG    MCHC 36.0 31.0 - 37.0 g/dL    RDW 13.2 11.6 - 14.5 %    PLATELET 420 204 - 520 K/uL    MPV 10.0 9.2 - 11.8 FL    NEUTROPHILS 68 40 - 73 %    LYMPHOCYTES 26 21 - 52 %    MONOCYTES 6 3 - 10 %    EOSINOPHILS 0 0 - 5 %    BASOPHILS 0 0 - 2 %    ABS. NEUTROPHILS 5.3 1.8 - 8.0 K/UL    ABS. LYMPHOCYTES 2.0 0.9 - 3.6 K/UL    ABS. MONOCYTES 0.5 0.05 - 1.2 K/UL    ABS. EOSINOPHILS 0.0 0.0 - 0.4 K/UL    ABS. BASOPHILS 0.0 0.0 - 0.1 K/UL    DF AUTOMATED     NT-PRO BNP    Collection Time: 12/23/20  9:15 PM   Result Value Ref Range    NT pro-BNP 15 0 - 900 PG/ML   POC LACTIC ACID    Collection Time: 12/23/20  9:17 PM   Result Value Ref Range    Lactic Acid (POC) 0.80 0.40 - 2.00 mmol/L   POC G3    Collection Time: 12/23/20 10:56 PM   Result Value Ref Range    Device: ROOM AIR      FIO2 (POC) 0.21 %    pH (POC) 7.38 7.35 - 7.45      pCO2 (POC) 38.8 35.0 - 45.0 MMHG    pO2 (POC) 74 (L) 80 - 100 MMHG    HCO3 (POC) 22.6 22 - 26 MMOL/L    sO2 (POC) 92 92 - 97 %    Base deficit (POC) 2 mmol/L    Allens test (POC) YES      Total resp. rate 28      Site LEFT RADIAL      Patient temp.  103.2      Specimen type (POC) ARTERIAL      Performed by Song Downing    TYPE & SCREEN    Collection Time: 12/24/20 12:04 AM   Result Value Ref Range    Crossmatch Expiration 12/27/2020,2359     ABO/Rh(D) A POSITIVE     Antibody screen NEG      CXR pending

## 2020-12-24 NOTE — PROGRESS NOTES
Bedside and Verbal shift change report given to NAA Garcia RN (oncoming nurse) by Yuly Topete RN (offgoing nurse). Report included the following information SBAR, Kardex, Intake/Output, MAR and Recent Results. 0825 Shift assessment complete. 1552 Plasma transfusion initiated. Pt vitals stable within his normal limits. 1608 Plasma administration completed. Pt vital signs stable. Call light within reach. Shift uneventful with no c/o chest pain or sob. Bedside and Verbal shift change report given to Alfredo Walker (oncoming nurse) by NAA Garcia RN (offgoing nurse). Report included the following information SBAR, Kardex, Intake/Output, MAR and Recent Results.

## 2020-12-24 NOTE — ED PROVIDER NOTES
EMERGENCY DEPARTMENT HISTORY AND PHYSICAL EXAM    Date: 12/23/2020  Patient Name: Scot Duff    History of Presenting Illness     Chief Complaint   Patient presents with    Generalized Body Aches    Fever    Positive For Covid-19         History Provided By: Patient    Additional History (Context): Scot Duff is a 54 y.o. malepresents to the emergency department via private vehicle C/O wheezing shortness of breath, fever, chest pain after being diagnosed with Covid 3 days ago. Patient reports that he was admitted overnight and discharged when he did well overnight. Patient reports that today he woke up with worsening shortness of breath and chest pain. Reports 1 bout of nausea and vomiting. States that he went to [facility] however left without being seen. Pt denies abdominal pain, diarrhea and any other sxs or complaints. PCP: Eric Oh MD        Past History     Past Medical History:  History reviewed. No pertinent past medical history. Past Surgical History:  History reviewed. No pertinent surgical history. Family History:  History reviewed. No pertinent family history. Social History:  Social History     Tobacco Use    Smoking status: Never Smoker    Smokeless tobacco: Current User   Substance Use Topics    Alcohol use: Yes     Alcohol/week: 2.0 standard drinks     Types: 2 Cans of beer per week    Drug use: Not on file       Allergies:  No Known Allergies      Review of Systems   Review of Systems   Constitutional: Negative for chills and fever. HENT: Negative for congestion, ear pain, sinus pain and sore throat. Eyes: Negative for pain and visual disturbance. Respiratory: Positive for cough, chest tightness and shortness of breath. Cardiovascular: Negative for chest pain and leg swelling. Gastrointestinal: Positive for nausea and vomiting. Negative for abdominal pain, constipation and diarrhea. Genitourinary: Negative for dysuria and hematuria. Musculoskeletal: Negative for back pain and neck pain. Skin: Negative for pallor and rash. Neurological: Negative for dizziness, tremors, weakness, light-headedness and headaches. All other systems reviewed and are negative. Physical Exam     Vitals:    12/23/20 2053 12/23/20 2130 12/23/20 2200 12/23/20 2230   BP: (!) 135/93 (!) 145/96 (!) 135/99 124/88   Pulse: (!) 113 (!) 105     Resp: 22 28     Temp: (!) 101.6 °F (38.7 °C)      SpO2: 94% 92% 90% 92%   Weight: 126.1 kg (278 lb)      Height: 6' (1.829 m)        Physical Exam    Nursing note and vitals reviewed    Constitutional: Middle-aged -American male, nontoxic head: Normocephalic, Atraumatic  Eyes: Pupils are equal, round, and reactive to light, EOMI  Neck: Supple, non-tender  Cardiovascular: Tachycardic, no murmurs, rubs, or gallops, + 2 radial pulses bilaterally  Chest: Normal work of breathing and chest excursion bilaterally  Lungs: Bibasilar crackles, no wheezes, no rhonchi  Abdomen: Soft, non tender, non distended, normoactive bowel sounds  Back: No evidence of trauma or deformity  Extremities: No evidence of trauma or deformity, no LE edema.  No streaking erythema, vesicular lesions, ulcerations or bulla  Skin: Warm and dry, normal cap refill  Neuro: Alert and appropriate, CN intact, normal speech, moving all 4 extremities freely and symmetrically  Psychiatric: Normal mood and affect       Diagnostic Study Results     Labs -     Recent Results (from the past 12 hour(s))   MAGNESIUM    Collection Time: 12/23/20  9:15 PM   Result Value Ref Range    Magnesium 2.2 1.6 - 2.6 mg/dL   PROTHROMBIN TIME + INR    Collection Time: 12/23/20  9:15 PM   Result Value Ref Range    Prothrombin time 13.4 11.5 - 15.2 sec    INR 1.0 0.8 - 1.2     PTT    Collection Time: 12/23/20  9:15 PM   Result Value Ref Range    aPTT 32.6 23.0 - 36.4 SEC   FIBRINOGEN    Collection Time: 12/23/20  9:15 PM   Result Value Ref Range    Fibrinogen 481 (H) 210 - 451 mg/dL METABOLIC PANEL, COMPREHENSIVE    Collection Time: 12/23/20  9:15 PM   Result Value Ref Range    Sodium 137 136 - 145 mmol/L    Potassium 3.9 3.5 - 5.5 mmol/L    Chloride 103 100 - 111 mmol/L    CO2 25 21 - 32 mmol/L    Anion gap 9 3.0 - 18 mmol/L    Glucose 103 (H) 74 - 99 mg/dL    BUN 17 7.0 - 18 MG/DL    Creatinine 1.35 (H) 0.6 - 1.3 MG/DL    BUN/Creatinine ratio 13 12 - 20      GFR est AA >60 >60 ml/min/1.73m2    GFR est non-AA 55 (L) >60 ml/min/1.73m2    Calcium 8.7 8.5 - 10.1 MG/DL    Bilirubin, total 0.6 0.2 - 1.0 MG/DL    ALT (SGPT) 37 16 - 61 U/L    AST (SGOT) 41 (H) 10 - 38 U/L    Alk. phosphatase 61 45 - 117 U/L    Protein, total 8.1 6.4 - 8.2 g/dL    Albumin 3.8 3.4 - 5.0 g/dL    Globulin 4.3 (H) 2.0 - 4.0 g/dL    A-G Ratio 0.9 0.8 - 1.7     CBC WITH AUTOMATED DIFF    Collection Time: 12/23/20  9:15 PM   Result Value Ref Range    WBC 7.8 4.6 - 13.2 K/uL    RBC 4.82 4.70 - 5.50 M/uL    HGB 15.1 13.0 - 16.0 g/dL    HCT 42.0 36.0 - 48.0 %    MCV 87.1 74.0 - 97.0 FL    MCH 31.3 24.0 - 34.0 PG    MCHC 36.0 31.0 - 37.0 g/dL    RDW 13.2 11.6 - 14.5 %    PLATELET 774 200 - 184 K/uL    MPV 10.0 9.2 - 11.8 FL    NEUTROPHILS 68 40 - 73 %    LYMPHOCYTES 26 21 - 52 %    MONOCYTES 6 3 - 10 %    EOSINOPHILS 0 0 - 5 %    BASOPHILS 0 0 - 2 %    ABS. NEUTROPHILS 5.3 1.8 - 8.0 K/UL    ABS. LYMPHOCYTES 2.0 0.9 - 3.6 K/UL    ABS. MONOCYTES 0.5 0.05 - 1.2 K/UL    ABS. EOSINOPHILS 0.0 0.0 - 0.4 K/UL    ABS.  BASOPHILS 0.0 0.0 - 0.1 K/UL    DF AUTOMATED     NT-PRO BNP    Collection Time: 12/23/20  9:15 PM   Result Value Ref Range    NT pro-BNP 15 0 - 900 PG/ML   POC LACTIC ACID    Collection Time: 12/23/20  9:17 PM   Result Value Ref Range    Lactic Acid (POC) 0.80 0.40 - 2.00 mmol/L   POC G3    Collection Time: 12/23/20 10:56 PM   Result Value Ref Range    Device: ROOM AIR      FIO2 (POC) 0.21 %    pH (POC) 7.38 7.35 - 7.45      pCO2 (POC) 38.8 35.0 - 45.0 MMHG    pO2 (POC) 74 (L) 80 - 100 MMHG    HCO3 (POC) 22.6 22 - 26 MMOL/L    sO2 (POC) 92 92 - 97 %    Base deficit (POC) 2 mmol/L    Allens test (POC) YES      Total resp. rate 28      Site LEFT RADIAL      Patient temp. 103.2      Specimen type (POC) ARTERIAL      Performed by Prasanth Salazar        Radiologic Studies -   XR CHEST PORT    (Results Pending)     CT Results  (Last 48 hours)    None        CXR Results  (Last 48 hours)    None            Medical Decision Making   I am the first provider for this patient. I reviewed the vital signs, available nursing notes, past medical history, past surgical history, family history and social history. Vital Signs-Reviewed the patient's vital signs. Pulse Oximetry Analysis -94% on room air    Cardiac Monitor:  Rate: 13 bpm  Rhythm: Regular    EKG interpretation: (Preliminary)  9:03 PM   Sinus tachycardia 101 bpm.  CO interval 138 ms. QRS 98 ms. QTc 409 ms. No acute ST elevation    Records Reviewed: Nursing Notes and Old Medical Records    Provider Notes:   54 y.o. male with known Covid presenting with shortness of breath, chest pain. Presentation, patient is febrile one 101. 6. He is also tachycardic and borderline hypoxic at 94% on room air. Patient meeting sirs criteria. Will initiate septic work-up. Will start steroids and antibiotic therapy. Will check ABG. Procedures:  Procedures    ED Course:   9:03 PM   Initial assessment performed. The patients presenting problems have been discussed, and they are in agreement with the care plan formulated and outlined with them. I have encouraged them to ask questions as they arise throughout their visit. 11:48 PM  Patient's ABG confirming hypoxia. Placed on 2 L of supplemental oxygen. Lactic acid is within normal limits. Due to his hypoxia, will admit. Diagnosis and Disposition     11:41 PM  I have spent 35 minutes of critical care time involved in lab review, consultations with specialist, family decision-making, and documentation.   During this entire length of time I was immediately available to the patient. Critical Care: The reason for providing this level of medical care for this critically ill patient was due a critical illness that impaired one or more vital organ systems such that there was a high probability of imminent or life threatening deterioration in the patients condition. This care involved high complexity decision making to assess, manipulate, and support vital system functions, to treat this degreee vital organ system failure and to prevent further life threatening deterioration of the patients condition. Core Measures:  For Hospitalized Patients:    1. Hospitalization Decision Time:  The decision to hospitalize the patient was made by Delmis Mooney DO at 11:49 PM on 12/23/2020    2. Aspirin: Aspirin was not given because the patient did not present with a stroke at the time of their Emergency Department evaluation    11:41 PM  Patient is being admitted to the hospital by Dr. Pao Diaz. The results of their tests and reasons for their admission have been discussed with them and/or available family. They convey agreement and understanding for the need to be admitted and for their admission diagnosis. CONDITIONS ON ADMISSION:  Sepsis is not present at the time of admission. Pneumonia is present at the time of admission. MRSA is not present at the time of admission. Wound infection is not present at the time of admission. Pressure Ulcer is not present at the time of admission. CLINICAL IMPRESSION:    1. COVID-19 virus infection    2. Hypoxia      ____________________________________     Please note that this dictation was completed with [email or web address], the computer voice recognition software. Quite often unanticipated grammatical, syntax, homophones, and other interpretive errors are inadvertently transcribed by the computer software. Please disregard these errors.   Please excuse any errors that have escaped final proofreading.

## 2020-12-24 NOTE — CONSULTS
Brief Remdesivir Note    Given ethnicity/obesity and oxygen requirements, he is high risk for progression to intubation/death. Agree with remdesivir.     Cristina Castillo MD  Cell [phone]  [facility]

## 2020-12-24 NOTE — ED NOTES
Patient recently tested positive for COVID and states he is feeling worse. Patient endorses pain \"everywhere. \" Patient states he cannot breathe with his mask on.

## 2020-12-25 LAB
ALBUMIN SERPL-MCNC: 3.4 G/DL (ref 3.4–5)
ALBUMIN/GLOB SERPL: 0.9 {RATIO} (ref 0.8–1.7)
ALP SERPL-CCNC: 50 U/L (ref 45–117)
ALT SERPL-CCNC: 35 U/L (ref 16–61)
ANION GAP SERPL CALC-SCNC: 6 MMOL/L (ref 3–18)
APTT PPP: 36.1 SEC (ref 23–36.4)
AST SERPL-CCNC: 35 U/L (ref 10–38)
BASOPHILS # BLD: 0 K/UL (ref 0–0.1)
BASOPHILS NFR BLD: 0 % (ref 0–2)
BILIRUB SERPL-MCNC: 0.4 MG/DL (ref 0.2–1)
BLD PROD TYP BPU: NORMAL
BPU ID: NORMAL
BUN SERPL-MCNC: 20 MG/DL (ref 7–18)
BUN/CREAT SERPL: 18 (ref 12–20)
CALCIUM SERPL-MCNC: 8.9 MG/DL (ref 8.5–10.1)
CALLED TO:,BCALL1: NORMAL
CHLORIDE SERPL-SCNC: 104 MMOL/L (ref 100–111)
CO2 SERPL-SCNC: 29 MMOL/L (ref 21–32)
CREAT SERPL-MCNC: 1.13 MG/DL (ref 0.6–1.3)
D DIMER PPP FEU-MCNC: 0.38 UG/ML(FEU)
DIFFERENTIAL METHOD BLD: ABNORMAL
EOSINOPHIL # BLD: 0 K/UL (ref 0–0.4)
EOSINOPHIL NFR BLD: 0 % (ref 0–5)
ERYTHROCYTE [DISTWIDTH] IN BLOOD BY AUTOMATED COUNT: 13.1 % (ref 11.6–14.5)
FIBRINOGEN PPP-MCNC: 416 MG/DL (ref 210–451)
GLOBULIN SER CALC-MCNC: 3.7 G/DL (ref 2–4)
GLUCOSE SERPL-MCNC: 128 MG/DL (ref 74–99)
HCT VFR BLD AUTO: 38.7 % (ref 36–48)
HGB BLD-MCNC: 13.8 G/DL (ref 13–16)
INR PPP: 1.1 (ref 0.8–1.2)
LYMPHOCYTES # BLD: 2.4 K/UL (ref 0.9–3.6)
LYMPHOCYTES NFR BLD: 33 % (ref 21–52)
MCH RBC QN AUTO: 31.2 PG (ref 24–34)
MCHC RBC AUTO-ENTMCNC: 35.7 G/DL (ref 31–37)
MCV RBC AUTO: 87.4 FL (ref 74–97)
MONOCYTES # BLD: 0.3 K/UL (ref 0.05–1.2)
MONOCYTES NFR BLD: 4 % (ref 3–10)
NEUTS SEG # BLD: 4.6 K/UL (ref 1.8–8)
NEUTS SEG NFR BLD: 63 % (ref 40–73)
PLATELET # BLD AUTO: 151 K/UL (ref 135–420)
PMV BLD AUTO: 9.5 FL (ref 9.2–11.8)
POTASSIUM SERPL-SCNC: 4.1 MMOL/L (ref 3.5–5.5)
PROT SERPL-MCNC: 7.1 G/DL (ref 6.4–8.2)
PROTHROMBIN TIME: 14.4 SEC (ref 11.5–15.2)
RBC # BLD AUTO: 4.43 M/UL (ref 4.7–5.5)
SODIUM SERPL-SCNC: 139 MMOL/L (ref 136–145)
STATUS OF UNIT,%ST: NORMAL
UNIT DIVISION, %UDIV: 0
WBC # BLD AUTO: 7.3 K/UL (ref 4.6–13.2)

## 2020-12-25 PROCEDURE — 74011250637 HC RX REV CODE- 250/637: Performed by: FAMILY MEDICINE

## 2020-12-25 PROCEDURE — 74011250636 HC RX REV CODE- 250/636: Performed by: FAMILY MEDICINE

## 2020-12-25 PROCEDURE — 85610 PROTHROMBIN TIME: CPT

## 2020-12-25 PROCEDURE — 85379 FIBRIN DEGRADATION QUANT: CPT

## 2020-12-25 PROCEDURE — 85730 THROMBOPLASTIN TIME PARTIAL: CPT

## 2020-12-25 PROCEDURE — 85025 COMPLETE CBC W/AUTO DIFF WBC: CPT

## 2020-12-25 PROCEDURE — 77010033678 HC OXYGEN DAILY

## 2020-12-25 PROCEDURE — 74011000250 HC RX REV CODE- 250: Performed by: INTERNAL MEDICINE

## 2020-12-25 PROCEDURE — 80053 COMPREHEN METABOLIC PANEL: CPT

## 2020-12-25 PROCEDURE — 74011000258 HC RX REV CODE- 258: Performed by: INTERNAL MEDICINE

## 2020-12-25 PROCEDURE — 74011000250 HC RX REV CODE- 250: Performed by: FAMILY MEDICINE

## 2020-12-25 PROCEDURE — 85384 FIBRINOGEN ACTIVITY: CPT

## 2020-12-25 PROCEDURE — 65660000000 HC RM CCU STEPDOWN

## 2020-12-25 RX ADMIN — ENOXAPARIN SODIUM 30 MG: 30 INJECTION SUBCUTANEOUS at 15:00

## 2020-12-25 RX ADMIN — ACETAMINOPHEN 650 MG: 325 TABLET ORAL at 21:50

## 2020-12-25 RX ADMIN — ACETAMINOPHEN 650 MG: 325 TABLET ORAL at 11:30

## 2020-12-25 RX ADMIN — FAMOTIDINE 20 MG: 20 TABLET, FILM COATED ORAL at 08:55

## 2020-12-25 RX ADMIN — GUAIFENESIN AND DEXTROMETHORPHAN 5 ML: 100; 10 SYRUP ORAL at 22:09

## 2020-12-25 RX ADMIN — ASPIRIN 81 MG: 81 TABLET, CHEWABLE ORAL at 08:56

## 2020-12-25 RX ADMIN — Medication 500 MG: at 08:56

## 2020-12-25 RX ADMIN — Medication 6 TABLET: at 08:55

## 2020-12-25 RX ADMIN — Medication 500 MG: at 21:50

## 2020-12-25 RX ADMIN — DEXAMETHASONE 6 MG: 4 TABLET ORAL at 08:55

## 2020-12-25 RX ADMIN — ENOXAPARIN SODIUM 30 MG: 30 INJECTION SUBCUTANEOUS at 05:15

## 2020-12-25 RX ADMIN — ZINC SULFATE 220 MG (50 MG) CAPSULE 1 CAPSULE: CAPSULE at 08:56

## 2020-12-25 RX ADMIN — ZINC SULFATE 220 MG (50 MG) CAPSULE 1 CAPSULE: CAPSULE at 21:50

## 2020-12-25 RX ADMIN — CEFTRIAXONE 1 G: 1 INJECTION, POWDER, FOR SOLUTION INTRAMUSCULAR; INTRAVENOUS at 00:51

## 2020-12-25 RX ADMIN — REMDESIVIR 100 MG: 100 INJECTION, POWDER, LYOPHILIZED, FOR SOLUTION INTRAVENOUS at 09:17

## 2020-12-25 RX ADMIN — Medication 10 ML: at 05:16

## 2020-12-25 RX ADMIN — Medication 10 MG: at 21:50

## 2020-12-25 RX ADMIN — AZITHROMYCIN MONOHYDRATE 500 MG: 500 INJECTION, POWDER, LYOPHILIZED, FOR SOLUTION INTRAVENOUS at 00:52

## 2020-12-25 RX ADMIN — Medication 10 ML: at 21:50

## 2020-12-25 RX ADMIN — Medication 10 ML: at 14:00

## 2020-12-25 NOTE — PROGRESS NOTES
0715: Assumed patient care from off going nurse [id number]     9583: Shift assessment completed at this time. Patient would like to shower informed him of the not having the ability to shower due to his tele box. Patient ok with that this nurse provided patient with new gown, additional lining, towels, soap and lotion to freshen up. Bed locked in lowest position call bell is in reach. 1117: Reassessment completed at this time. Patient has fever 100.3 F, this nurse will administer tylenol and recheck temp. Patient resting with no c/o at this time, stated he is just sad that he is here for [place]. 1330: This nurse rechecked patients temp.  Reading 98.3 F

## 2020-12-25 NOTE — PROGRESS NOTES
Problem: Falls - Risk of  Goal: *Absence of Falls  Description: Document [place] Fall Risk and appropriate interventions in the flowsheet. Outcome: Progressing Towards Goal  Note: Fall Risk Interventions:            Medication Interventions: Teach patient to arise slowly, Patient to call before getting OOB, Evaluate medications/consider consulting pharmacy                   Problem: Patient Education: Go to Patient Education Activity  Goal: Patient/Family Education  Outcome: Progressing Towards Goal     Problem: Airway Clearance - Ineffective  Goal: Achieve or maintain patent airway  Outcome: Progressing Towards Goal     Problem: Gas Exchange - Impaired  Goal: Absence of hypoxia  Outcome: Progressing Towards Goal  Goal: Promote optimal lung function  Outcome: Progressing Towards Goal     Problem: Breathing Pattern - Ineffective  Goal: Ability to achieve and maintain a regular respiratory rate  Outcome: Progressing Towards Goal     Problem:  Body Temperature -  Risk of, Imbalanced  Goal: Ability to maintain a body temperature within defined limits  Outcome: Progressing Towards Goal  Goal: Will regain or maintain usual level of consciousness  Outcome: Progressing Towards Goal  Goal: Complications related to the disease process, condition or treatment will be avoided or minimized  Outcome: Progressing Towards Goal     Problem: Isolation Precautions - Risk of Spread of Infection  Goal: Prevent transmission of infectious organism to others  Outcome: Progressing Towards Goal     Problem: Nutrition Deficits  Goal: Optimize nutrtional status  Outcome: Progressing Towards Goal     Problem: Risk for Fluid Volume Deficit  Goal: Maintain normal heart rhythm  Outcome: Progressing Towards Goal  Goal: Maintain absence of muscle cramping  Outcome: Progressing Towards Goal  Goal: Maintain normal serum potassium, sodium, calcium, phosphorus, and pH  Outcome: Progressing Towards Goal     Problem: Loneliness or Risk for Loneliness  Goal: Demonstrate positive use of time alone when socialization is not possible  Outcome: Progressing Towards Goal     Problem: Fatigue  Goal: Verbalize increase energy and improved vitality  Outcome: Progressing Towards Goal     Problem: Patient Education: Go to Patient Education Activity  Goal: Patient/Family Education  Outcome: Progressing Towards Goal     Problem: General Medical Care Plan  Goal: *Vital signs within specified parameters  Outcome: Progressing Towards Goal  Goal: *Labs within defined limits  Outcome: Progressing Towards Goal  Goal: *Absence of infection signs and symptoms  Outcome: Progressing Towards Goal  Goal: *Optimal pain control at patient's stated goal  Outcome: Progressing Towards Goal  Goal: *Skin integrity maintained  Outcome: Progressing Towards Goal  Goal: *Fluid volume balance  Outcome: Progressing Towards Goal  Goal: *Optimize nutritional status  Outcome: Progressing Towards Goal  Goal: *Anxiety reduced or absent  Outcome: Progressing Towards Goal  Goal: *Progressive mobility and function (eg: ADL's)  Outcome: Progressing Towards Goal     Problem: Patient Education: Go to Patient Education Activity  Goal: Patient/Family Education  Outcome: Progressing Towards Goal     Problem: Pain  Goal: *Control of Pain  Outcome: Progressing Towards Goal  Goal: *PALLIATIVE CARE:  Alleviation of Pain  Outcome: Progressing Towards Goal     Problem: Patient Education: Go to Patient Education Activity  Goal: Patient/Family Education  Outcome: Progressing Towards Goal     Problem:  Activity Intolerance  Goal: *Oxygen saturation during activity within specified parameters  Outcome: Progressing Towards Goal  Goal: *Able to remain out of bed as prescribed  Outcome: Progressing Towards Goal     Problem: Patient Education: Go to Patient Education Activity  Goal: Patient/Family Education  Outcome: Progressing Towards Goal     Problem: Breathing Pattern - Ineffective  Goal: *Absence of hypoxia  Outcome: Progressing Towards Goal  Goal: *Use of effective breathing techniques  Outcome: Progressing Towards Goal  Goal: *PALLIATIVE CARE:  Alleviation of Dyspnea  Outcome: Progressing Towards Goal     Problem: Patient Education: Go to Patient Education Activity  Goal: Patient/Family Education  Outcome: Progressing Towards Goal

## 2020-12-25 NOTE — PROGRESS NOTES
1918:  Assumed care for patient, received written report from Lucio Gottron, RN. Patient quietly lying in bed watching television, with no complaints of pain or discomfort at the time. Whiteboard updated, bed at the lowest position with call bell within reach. [facility] [place] Cardiac/Medical Night Shift Chart Audit    Chart Audit completed? YES. Shift uneventful. Verbal shift change report given to Corinna Chavez RN (oncoming nurse) by Spencer Ruth RN   (offgoing nurse). Report included the following information SBAR, Kardex, ED Summary, Procedure Summary, Intake/Output, MAR, Med Rec Status, Cardiac Rhythm SR and Alarm Parameters .

## 2020-12-25 NOTE — PROGRESS NOTES
Physician Progress Note      PATIENT:               Norine Goodpasture  CSN #:                  [id number]  :                       1965  ADMIT DATE:       2020 9:00 PM  [address] [place] [employer] DATE:  RESPONDING  PROVIDER #:        Teresa Alford MD          QUERY TEXT:    Pt admitted with fever, dry cough, SOB) and noted to have  Covid-19 and/or symptoms related to dxs below). Please document in progress notes and discharge summary if you are evaluating or treating any of the following: The medical record reflects the following:  Risk Factors:  exposure to someone with COVID-19)    Clinical Indicators: Fever, cough, SOB, abdominal discomfort. Exposure to co-worker positive with COVID and was diagnosed 3 days ago. Treatment: admit to isolation unit -nasal cannula oxygen-prone positioning as tolerated-dexamethasone-antioxidant cocktail  -daily ASA -daily COVID labs-convalescent plasma . CXR results pending , Zithromax, Rocephin IV    Thank- You  Marii Arriaga RN [facility] [phone]  Options provided:  -- Acute bronchitis due to COVID-19  -- Acute lower respiratory infection due to COVID-19  -- ARDS due to COVID-19  -- Pneumonia due to COVID-19  -- Other - I will add my own diagnosis  -- Disagree - Not applicable / Not valid  -- Disagree - Clinically unable to determine / Unknown  -- Refer to Clinical Documentation Reviewer    PROVIDER RESPONSE TEXT:    This patient has pneumonia due to COVID-19.     Query created by: Connie Amaral on 2020 6:40 AM      Electronically signed by:  Teresa Alford MD 2020 11:12 PM

## 2020-12-25 NOTE — PROGRESS NOTES
Problem: Falls - Risk of  Goal: *Absence of Falls  Description: Document Earma Taylor Fall Risk and appropriate interventions in the flowsheet.   Outcome: Progressing Towards Goal  Note: Fall Risk Interventions:            Medication Interventions: Teach patient to arise slowly

## 2020-12-25 NOTE — PROGRESS NOTES
Hospitalist Progress Note    Patient: Deedee Way MRN: [id number]  CSN: [id number]    YOB: 1965  Age: 54 y.o. Sex: male    DOA: 12/23/2020 LOS:  LOS: 1 day                Assessment/Plan     Patient Active Problem List   Diagnosis Code    Male erectile dysfunction, unspecified N52.9    Hypoxia R09.02    COVID-19 virus infection U07.1    Obesity (BMI 30-39. 9) E66.9            55 y/o male with obesity admitted for COVID-19 infection with hypoxia. COVID 19 pneumonia  Continue with vitamin/antioxidant cocktail. Dexamethasone  Convalescent plasma  remdesivir   lovenox  Seen by ID    Disposition : TBD    Review of systems  General: No fevers or chills. Cardiovascular: No chest pain or pressure. No palpitations. Pulmonary: No shortness of breath. Gastrointestinal: No nausea, vomiting. Physical Exam:  General: Awake, cooperative, no acute distress    HEENT: NC, Atraumatic. PERRLA, anicteric sclerae. Lungs: CTA Bilaterally. No Wheezing/Rhonchi/Rales. Heart:  S1 S2,  No murmur, No Rubs, No Gallops  Abdomen: Soft, Non distended, Non tender.  +Bowel sounds,   Extremities: No c/c/e  Psych:   Not anxious or agitated. Neurologic:  No acute neurological deficit. Vital signs/Intake and Output:  Visit Vitals  /76   Pulse 71   Temp 98 °F (36.7 °C)   Resp 17   Ht 6' (1.829 m)   Wt 126.1 kg (278 lb)   SpO2 97%   BMI 37.70 kg/m²     Current Shift:  No intake/output data recorded. Last three shifts:  12/23 1901 - 12/25 0700  In: 1700 [P.O.:700;  I.V.:1000]  Out: -             Labs: Results:       Chemistry Recent Labs     12/25/20  0117 12/23/20 2115   * 103*    137   K 4.1 3.9    103   CO2 29 25   BUN 20* 17   CREA 1.13 1.35*   CA 8.9 8.7   AGAP 6 9   BUCR 18 13   AP 50 61   TP 7.1 8.1   ALB 3.4 3.8   GLOB 3.7 4.3*   AGRAT 0.9 0.9      CBC w/Diff Recent Labs     12/25/20  0117 12/23/20  2115   WBC 7.3 7.8   RBC 4.43* 4.82   HGB 13.8 15.1   HCT 38.7 42.0   PLT 151 165   GRANS 63 68   LYMPH 33 26   EOS 0 0      Cardiac Enzymes No results for input(s): CPK, CKND1, ROMARIO in the last 72 hours. No lab exists for component: CKRMB, TROIP   Coagulation Recent Labs     12/25/20 0117 12/24/20  0454   PTP 14.4 13.9   INR 1.1 1.1   APTT 36.1 30.8       Lipid Panel No results found for: CHOL, CHOLPOCT, CHOLX, CHLST, CHOLV, 507983, HDL, HDLP, LDL, LDLC, DLDLP, 837233, VLDLC, VLDL, TGLX, TRIGL, TRIGP, TGLPOCT, CHHD, CHHDX   BNP No results for input(s): BNPP in the last 72 hours.    Liver Enzymes Recent Labs     12/25/20 0117   TP 7.1   ALB 3.4   AP 50      Thyroid Studies No results found for: T4, T3U, TSH, TSHEXT     Procedures/imaging: see electronic medical records for all procedures/Xrays and details which were not copied into this note but were reviewed prior to creation of Plan

## 2020-12-26 LAB
ALBUMIN SERPL-MCNC: 3.1 G/DL (ref 3.4–5)
ALBUMIN/GLOB SERPL: 0.8 {RATIO} (ref 0.8–1.7)
ALP SERPL-CCNC: 51 U/L (ref 45–117)
ALT SERPL-CCNC: 32 U/L (ref 16–61)
ANION GAP SERPL CALC-SCNC: 5 MMOL/L (ref 3–18)
APTT PPP: 33.6 SEC (ref 23–36.4)
AST SERPL-CCNC: 33 U/L (ref 10–38)
ATRIAL RATE: 101 BPM
BASOPHILS # BLD: 0 K/UL (ref 0–0.1)
BASOPHILS NFR BLD: 0 % (ref 0–2)
BILIRUB SERPL-MCNC: 0.5 MG/DL (ref 0.2–1)
BUN SERPL-MCNC: 19 MG/DL (ref 7–18)
BUN/CREAT SERPL: 19 (ref 12–20)
CALCIUM SERPL-MCNC: 8.6 MG/DL (ref 8.5–10.1)
CALCULATED P AXIS, ECG09: 44 DEGREES
CALCULATED R AXIS, ECG10: -21 DEGREES
CALCULATED T AXIS, ECG11: -5 DEGREES
CHLORIDE SERPL-SCNC: 104 MMOL/L (ref 100–111)
CO2 SERPL-SCNC: 30 MMOL/L (ref 21–32)
CREAT SERPL-MCNC: 1 MG/DL (ref 0.6–1.3)
D DIMER PPP FEU-MCNC: 0.48 UG/ML(FEU)
DIAGNOSIS, 93000: NORMAL
DIFFERENTIAL METHOD BLD: ABNORMAL
EOSINOPHIL # BLD: 0 K/UL (ref 0–0.4)
EOSINOPHIL NFR BLD: 0 % (ref 0–5)
ERYTHROCYTE [DISTWIDTH] IN BLOOD BY AUTOMATED COUNT: 12.9 % (ref 11.6–14.5)
FIBRINOGEN PPP-MCNC: 440 MG/DL (ref 210–451)
GLOBULIN SER CALC-MCNC: 3.9 G/DL (ref 2–4)
GLUCOSE SERPL-MCNC: 105 MG/DL (ref 74–99)
HCT VFR BLD AUTO: 39.3 % (ref 36–48)
HGB BLD-MCNC: 13.9 G/DL (ref 13–16)
INR PPP: 1.1 (ref 0.8–1.2)
LYMPHOCYTES # BLD: 2.4 K/UL (ref 0.9–3.6)
LYMPHOCYTES NFR BLD: 47 % (ref 21–52)
MCH RBC QN AUTO: 30.8 PG (ref 24–34)
MCHC RBC AUTO-ENTMCNC: 35.4 G/DL (ref 31–37)
MCV RBC AUTO: 86.9 FL (ref 74–97)
MONOCYTES # BLD: 0.3 K/UL (ref 0.05–1.2)
MONOCYTES NFR BLD: 5 % (ref 3–10)
NEUTS SEG # BLD: 2.4 K/UL (ref 1.8–8)
NEUTS SEG NFR BLD: 48 % (ref 40–73)
P-R INTERVAL, ECG05: 138 MS
PLATELET # BLD AUTO: 157 K/UL (ref 135–420)
PMV BLD AUTO: 9.7 FL (ref 9.2–11.8)
POTASSIUM SERPL-SCNC: 4 MMOL/L (ref 3.5–5.5)
PROT SERPL-MCNC: 7 G/DL (ref 6.4–8.2)
PROTHROMBIN TIME: 14.1 SEC (ref 11.5–15.2)
Q-T INTERVAL, ECG07: 316 MS
QRS DURATION, ECG06: 98 MS
QTC CALCULATION (BEZET), ECG08: 409 MS
RBC # BLD AUTO: 4.52 M/UL (ref 4.7–5.5)
SODIUM SERPL-SCNC: 139 MMOL/L (ref 136–145)
VENTRICULAR RATE, ECG03: 101 BPM
WBC # BLD AUTO: 5.1 K/UL (ref 4.6–13.2)

## 2020-12-26 PROCEDURE — 85025 COMPLETE CBC W/AUTO DIFF WBC: CPT

## 2020-12-26 PROCEDURE — 74011250637 HC RX REV CODE- 250/637: Performed by: HOSPITALIST

## 2020-12-26 PROCEDURE — 85379 FIBRIN DEGRADATION QUANT: CPT

## 2020-12-26 PROCEDURE — 74011250637 HC RX REV CODE- 250/637: Performed by: FAMILY MEDICINE

## 2020-12-26 PROCEDURE — 85610 PROTHROMBIN TIME: CPT

## 2020-12-26 PROCEDURE — 74011250636 HC RX REV CODE- 250/636: Performed by: FAMILY MEDICINE

## 2020-12-26 PROCEDURE — 77010033678 HC OXYGEN DAILY

## 2020-12-26 PROCEDURE — 85384 FIBRINOGEN ACTIVITY: CPT

## 2020-12-26 PROCEDURE — 74011000250 HC RX REV CODE- 250: Performed by: FAMILY MEDICINE

## 2020-12-26 PROCEDURE — 80053 COMPREHEN METABOLIC PANEL: CPT

## 2020-12-26 PROCEDURE — 74011000258 HC RX REV CODE- 258: Performed by: INTERNAL MEDICINE

## 2020-12-26 PROCEDURE — 65660000000 HC RM CCU STEPDOWN

## 2020-12-26 PROCEDURE — 74011000250 HC RX REV CODE- 250: Performed by: INTERNAL MEDICINE

## 2020-12-26 PROCEDURE — 85730 THROMBOPLASTIN TIME PARTIAL: CPT

## 2020-12-26 RX ORDER — TRAMADOL HYDROCHLORIDE 50 MG/1
50 TABLET ORAL
Status: DISCONTINUED | OUTPATIENT
Start: 2020-12-26 | End: 2020-12-28 | Stop reason: HOSPADM

## 2020-12-26 RX ADMIN — ENOXAPARIN SODIUM 30 MG: 30 INJECTION SUBCUTANEOUS at 17:18

## 2020-12-26 RX ADMIN — TRAMADOL HYDROCHLORIDE 50 MG: 50 TABLET, COATED ORAL at 13:35

## 2020-12-26 RX ADMIN — Medication 500 MG: at 09:36

## 2020-12-26 RX ADMIN — ZINC SULFATE 220 MG (50 MG) CAPSULE 1 CAPSULE: CAPSULE at 21:12

## 2020-12-26 RX ADMIN — GUAIFENESIN AND DEXTROMETHORPHAN 5 ML: 100; 10 SYRUP ORAL at 13:35

## 2020-12-26 RX ADMIN — Medication 500 MG: at 21:12

## 2020-12-26 RX ADMIN — ZINC SULFATE 220 MG (50 MG) CAPSULE 1 CAPSULE: CAPSULE at 09:36

## 2020-12-26 RX ADMIN — Medication 10 ML: at 14:00

## 2020-12-26 RX ADMIN — Medication 10 ML: at 06:26

## 2020-12-26 RX ADMIN — Medication 10 ML: at 21:12

## 2020-12-26 RX ADMIN — FAMOTIDINE 20 MG: 20 TABLET, FILM COATED ORAL at 09:36

## 2020-12-26 RX ADMIN — CEFTRIAXONE 1 G: 1 INJECTION, POWDER, FOR SOLUTION INTRAMUSCULAR; INTRAVENOUS at 01:58

## 2020-12-26 RX ADMIN — GUAIFENESIN AND DEXTROMETHORPHAN 5 ML: 100; 10 SYRUP ORAL at 21:12

## 2020-12-26 RX ADMIN — REMDESIVIR 100 MG: 100 INJECTION, POWDER, LYOPHILIZED, FOR SOLUTION INTRAVENOUS at 09:00

## 2020-12-26 RX ADMIN — ASPIRIN 81 MG: 81 TABLET, CHEWABLE ORAL at 09:36

## 2020-12-26 RX ADMIN — Medication 10 MG: at 21:12

## 2020-12-26 RX ADMIN — ENOXAPARIN SODIUM 30 MG: 30 INJECTION SUBCUTANEOUS at 01:58

## 2020-12-26 RX ADMIN — AZITHROMYCIN MONOHYDRATE 500 MG: 500 INJECTION, POWDER, LYOPHILIZED, FOR SOLUTION INTRAVENOUS at 01:58

## 2020-12-26 RX ADMIN — Medication 6 TABLET: at 09:36

## 2020-12-26 RX ADMIN — DEXAMETHASONE 6 MG: 4 TABLET ORAL at 09:37

## 2020-12-26 RX ADMIN — GUAIFENESIN AND DEXTROMETHORPHAN 5 ML: 100; 10 SYRUP ORAL at 09:42

## 2020-12-26 RX ADMIN — TRAMADOL HYDROCHLORIDE 50 MG: 50 TABLET, COATED ORAL at 21:12

## 2020-12-26 NOTE — PROGRESS NOTES
1935:  Assumed care for patient, received written report from Andrew Pandey RN. Patient quietly lying in bed with no complaints of pain or discomfort at the time. Whiteboard updated, bed at the lowest position with call bell within reach. [facility] Cardiac/Medical Night Shift Chart Audit    Chart Audit completed? YES. Shift uneventful. Verbal shift change report given to Sunday Marcos RN (oncoming nurse) by Jason Gonzalez RN   (offgoing nurse). Report included the following information SBAR, Kardex, ED Summary, Procedure Summary, Intake/Output, MAR, Recent Results, Med Rec Status, Cardiac Rhythm SR and Alarm Parameters .

## 2020-12-26 NOTE — PROGRESS NOTES
Problem: Falls - Risk of  Goal: *Absence of Falls  Description: Document Lenore Patrick Fall Risk and appropriate interventions in the flowsheet. Outcome: Progressing Towards Goal  Note: Fall Risk Interventions:            Medication Interventions: Teach patient to arise slowly, Patient to call before getting OOB, Evaluate medications/consider consulting pharmacy                   Problem: Patient Education: Go to Patient Education Activity  Goal: Patient/Family Education  Outcome: Progressing Towards Goal     Problem: Airway Clearance - Ineffective  Goal: Achieve or maintain patent airway  Outcome: Progressing Towards Goal     Problem: Gas Exchange - Impaired  Goal: Absence of hypoxia  Outcome: Progressing Towards Goal  Goal: Promote optimal lung function  Outcome: Progressing Towards Goal     Problem: Breathing Pattern - Ineffective  Goal: Ability to achieve and maintain a regular respiratory rate  Outcome: Progressing Towards Goal     Problem:  Body Temperature -  Risk of, Imbalanced  Goal: Ability to maintain a body temperature within defined limits  Outcome: Progressing Towards Goal  Goal: Will regain or maintain usual level of consciousness  Outcome: Progressing Towards Goal  Goal: Complications related to the disease process, condition or treatment will be avoided or minimized  Outcome: Progressing Towards Goal     Problem: Isolation Precautions - Risk of Spread of Infection  Goal: Prevent transmission of infectious organism to others  Outcome: Progressing Towards Goal     Problem: Nutrition Deficits  Goal: Optimize nutrtional status  Outcome: Progressing Towards Goal     Problem: Risk for Fluid Volume Deficit  Goal: Maintain normal heart rhythm  Outcome: Progressing Towards Goal  Goal: Maintain absence of muscle cramping  Outcome: Progressing Towards Goal  Goal: Maintain normal serum potassium, sodium, calcium, phosphorus, and pH  Outcome: Progressing Towards Goal     Problem: Loneliness or Risk for Loneliness  Goal: Demonstrate positive use of time alone when socialization is not possible  Outcome: Progressing Towards Goal     Problem: Fatigue  Goal: Verbalize increase energy and improved vitality  Outcome: Progressing Towards Goal     Problem: Patient Education: Go to Patient Education Activity  Goal: Patient/Family Education  Outcome: Progressing Towards Goal     Problem: General Medical Care Plan  Goal: *Vital signs within specified parameters  Outcome: Progressing Towards Goal  Goal: *Labs within defined limits  Outcome: Progressing Towards Goal  Goal: *Absence of infection signs and symptoms  Outcome: Progressing Towards Goal  Goal: *Optimal pain control at patient's stated goal  Outcome: Progressing Towards Goal  Goal: *Skin integrity maintained  Outcome: Progressing Towards Goal  Goal: *Fluid volume balance  Outcome: Progressing Towards Goal  Goal: *Optimize nutritional status  Outcome: Progressing Towards Goal  Goal: *Anxiety reduced or absent  Outcome: Progressing Towards Goal  Goal: *Progressive mobility and function (eg: ADL's)  Outcome: Progressing Towards Goal     Problem: Patient Education: Go to Patient Education Activity  Goal: Patient/Family Education  Outcome: Progressing Towards Goal     Problem: Pain  Goal: *Control of Pain  Outcome: Progressing Towards Goal  Goal: *PALLIATIVE CARE:  Alleviation of Pain  Outcome: Progressing Towards Goal     Problem: Patient Education: Go to Patient Education Activity  Goal: Patient/Family Education  Outcome: Progressing Towards Goal     Problem:  Activity Intolerance  Goal: *Oxygen saturation during activity within specified parameters  Outcome: Progressing Towards Goal  Goal: *Able to remain out of bed as prescribed  Outcome: Progressing Towards Goal     Problem: Patient Education: Go to Patient Education Activity  Goal: Patient/Family Education  Outcome: Progressing Towards Goal     Problem: Breathing Pattern - Ineffective  Goal: *Absence of hypoxia  Outcome: Progressing Towards Goal  Goal: *Use of effective breathing techniques  Outcome: Progressing Towards Goal  Goal: *PALLIATIVE CARE:  Alleviation of Dyspnea  Outcome: Progressing Towards Goal     Problem: Patient Education: Go to Patient Education Activity  Goal: Patient/Family Education  Outcome: Progressing Towards Goal

## 2020-12-26 NOTE — ROUTINE PROCESS
Written shift change report given to [facility] RN  (oncoming nurse) by Bryan Lake RN  (offgoing nurse). Report included the following information SBAR, Kardex and Recent Results.

## 2020-12-26 NOTE — PROGRESS NOTES
Problem: Falls - Risk of  Goal: *Absence of Falls  Description: Document Earma Taylor Fall Risk and appropriate interventions in the flowsheet.   Outcome: Progressing Towards Goal  Note: Fall Risk Interventions:            Medication Interventions: Teach patient to arise slowly, Patient to call before getting OOB

## 2020-12-26 NOTE — PROGRESS NOTES
Hospitalist Progress Note    Patient: Pastor Vera MRN: [id number]  CSN: [id number]    YOB: 1965  Age: 54 y.o. Sex: male    DOA: 12/23/2020 LOS:  LOS: 2 days                Assessment/Plan     Patient Active Problem List   Diagnosis Code    Male erectile dysfunction, unspecified N52.9    Hypoxia R09.02    COVID-19 virus infection U07.1    Obesity (BMI 30-39. 9) E66.9            53 y/o male with obesity admitted for COVID-19 infection with hypoxia. COVID 19 pneumonia  Continue with vitamin/antioxidant cocktail. Dexamethasone  Convalescent plasma  remdesivir   lovenox  Seen by ID    Disposition : TBD    Review of systems  General: No fevers or chills. Cardiovascular: No chest pain or pressure. No palpitations. Pulmonary: No shortness of breath. Gastrointestinal: No nausea, vomiting. Physical Exam:  General: Awake, cooperative, no acute distress    HEENT: NC, Atraumatic. PERRLA, anicteric sclerae. Lungs: CTA Bilaterally. No Wheezing/Rhonchi/Rales. Heart:  S1 S2,  No murmur, No Rubs, No Gallops  Abdomen: Soft, Non distended, Non tender.  +Bowel sounds,   Extremities: No c/c/e  Psych:   Not anxious or agitated. Neurologic:  No acute neurological deficit. Vital signs/Intake and Output:  Visit Vitals  /71   Pulse (!) 59   Temp 97.5 °F (36.4 °C)   Resp 16   Ht 6' (1.829 m)   Wt 126.1 kg (278 lb)   SpO2 95%   BMI 37.70 kg/m²     Current Shift:  No intake/output data recorded. Last three shifts:  No intake/output data recorded.             Labs: Results:       Chemistry Recent Labs     12/26/20  0212 12/25/20  0117 12/23/20  2115   * 128* 103*    139 137   K 4.0 4.1 3.9    104 103   CO2 30 29 25   BUN 19* 20* 17   CREA 1.00 1.13 1.35*   CA 8.6 8.9 8.7   AGAP 5 6 9   BUCR 19 18 13   AP 51 50 61   TP 7.0 7.1 8.1   ALB 3.1* 3.4 3.8   GLOB 3.9 3.7 4.3*   AGRAT 0.8 0.9 0.9      CBC w/Diff Recent Labs     12/26/20  0212 12/25/20  0117 12/23/20  2115   WBC 5.1 7.3 7.8   RBC 4.52* 4.43* 4.82   HGB 13.9 13.8 15.1   HCT 39.3 38.7 42.0    151 165   GRANS 48 63 68   LYMPH 47 33 26   EOS 0 0 0      Cardiac Enzymes No results for input(s): CPK, CKND1, ROMARIO in the last 72 hours. No lab exists for component: CKRMB, TROIP   Coagulation Recent Labs     12/26/20 0212 12/25/20  0117   PTP 14.1 14.4   INR 1.1 1.1   APTT 33.6 36.1       Lipid Panel No results found for: CHOL, CHOLPOCT, CHOLX, CHLST, CHOLV, 407483, HDL, HDLP, LDL, LDLC, DLDLP, 357316, VLDLC, VLDL, TGLX, TRIGL, TRIGP, TGLPOCT, CHHD, CHHDX   BNP No results for input(s): BNPP in the last 72 hours.    Liver Enzymes Recent Labs     12/26/20 0212   TP 7.0   ALB 3.1*   AP 51      Thyroid Studies No results found for: T4, T3U, TSH, TSHEXT, TSHEXT     Procedures/imaging: see electronic medical records for all procedures/Xrays and details which were not copied into this note but were reviewed prior to creation of Plan

## 2020-12-26 NOTE — PROGRESS NOTES
0700: Assumed care of pt from [address] [place] [address].  1330: Called Md for pain medication for pt complaints of headache. Orders were placed for new medication. 1342: Medication was given for headache see flow sheet. .  1430: pt stated head feels so much better. 1800: pt resting no sign of distress.

## 2020-12-27 LAB
ALBUMIN SERPL-MCNC: 3.1 G/DL (ref 3.4–5)
ALBUMIN/GLOB SERPL: 0.8 {RATIO} (ref 0.8–1.7)
ALP SERPL-CCNC: 48 U/L (ref 45–117)
ALT SERPL-CCNC: 29 U/L (ref 16–61)
ANION GAP SERPL CALC-SCNC: 5 MMOL/L (ref 3–18)
APTT PPP: 32.5 SEC (ref 23–36.4)
AST SERPL-CCNC: 28 U/L (ref 10–38)
BASOPHILS # BLD: 0 K/UL (ref 0–0.1)
BASOPHILS NFR BLD: 0 % (ref 0–2)
BILIRUB SERPL-MCNC: 0.5 MG/DL (ref 0.2–1)
BUN SERPL-MCNC: 19 MG/DL (ref 7–18)
BUN/CREAT SERPL: 19 (ref 12–20)
CALCIUM SERPL-MCNC: 8.4 MG/DL (ref 8.5–10.1)
CHLORIDE SERPL-SCNC: 103 MMOL/L (ref 100–111)
CO2 SERPL-SCNC: 31 MMOL/L (ref 21–32)
CREAT SERPL-MCNC: 1.01 MG/DL (ref 0.6–1.3)
D DIMER PPP FEU-MCNC: <0.27 UG/ML(FEU)
DIFFERENTIAL METHOD BLD: ABNORMAL
EOSINOPHIL # BLD: 0 K/UL (ref 0–0.4)
EOSINOPHIL NFR BLD: 0 % (ref 0–5)
ERYTHROCYTE [DISTWIDTH] IN BLOOD BY AUTOMATED COUNT: 12.9 % (ref 11.6–14.5)
FIBRINOGEN PPP-MCNC: 477 MG/DL (ref 210–451)
GLOBULIN SER CALC-MCNC: 3.9 G/DL (ref 2–4)
GLUCOSE SERPL-MCNC: 122 MG/DL (ref 74–99)
HCT VFR BLD AUTO: 38.4 % (ref 36–48)
HGB BLD-MCNC: 13.6 G/DL (ref 13–16)
INR PPP: 1.1 (ref 0.8–1.2)
LYMPHOCYTES # BLD: 2.3 K/UL (ref 0.9–3.6)
LYMPHOCYTES NFR BLD: 58 % (ref 21–52)
MCH RBC QN AUTO: 30.6 PG (ref 24–34)
MCHC RBC AUTO-ENTMCNC: 35.4 G/DL (ref 31–37)
MCV RBC AUTO: 86.5 FL (ref 74–97)
MONOCYTES # BLD: 0.3 K/UL (ref 0.05–1.2)
MONOCYTES NFR BLD: 8 % (ref 3–10)
NEUTS SEG # BLD: 1.4 K/UL (ref 1.8–8)
NEUTS SEG NFR BLD: 34 % (ref 40–73)
PLATELET # BLD AUTO: 183 K/UL (ref 135–420)
PMV BLD AUTO: 10.3 FL (ref 9.2–11.8)
POTASSIUM SERPL-SCNC: 3.6 MMOL/L (ref 3.5–5.5)
PROT SERPL-MCNC: 7 G/DL (ref 6.4–8.2)
PROTHROMBIN TIME: 14.4 SEC (ref 11.5–15.2)
RBC # BLD AUTO: 4.44 M/UL (ref 4.7–5.5)
SODIUM SERPL-SCNC: 139 MMOL/L (ref 136–145)
WBC # BLD AUTO: 4 K/UL (ref 4.6–13.2)

## 2020-12-27 PROCEDURE — 65660000000 HC RM CCU STEPDOWN

## 2020-12-27 PROCEDURE — 85025 COMPLETE CBC W/AUTO DIFF WBC: CPT

## 2020-12-27 PROCEDURE — 74011000250 HC RX REV CODE- 250: Performed by: INTERNAL MEDICINE

## 2020-12-27 PROCEDURE — 80053 COMPREHEN METABOLIC PANEL: CPT

## 2020-12-27 PROCEDURE — 77010033678 HC OXYGEN DAILY

## 2020-12-27 PROCEDURE — 74011000258 HC RX REV CODE- 258: Performed by: INTERNAL MEDICINE

## 2020-12-27 PROCEDURE — 85379 FIBRIN DEGRADATION QUANT: CPT

## 2020-12-27 PROCEDURE — 74011250637 HC RX REV CODE- 250/637: Performed by: HOSPITALIST

## 2020-12-27 PROCEDURE — 74011250637 HC RX REV CODE- 250/637: Performed by: FAMILY MEDICINE

## 2020-12-27 PROCEDURE — 74011250636 HC RX REV CODE- 250/636: Performed by: FAMILY MEDICINE

## 2020-12-27 PROCEDURE — 85730 THROMBOPLASTIN TIME PARTIAL: CPT

## 2020-12-27 PROCEDURE — 85384 FIBRINOGEN ACTIVITY: CPT

## 2020-12-27 PROCEDURE — 85610 PROTHROMBIN TIME: CPT

## 2020-12-27 RX ADMIN — Medication 10 ML: at 21:21

## 2020-12-27 RX ADMIN — Medication 6 TABLET: at 10:29

## 2020-12-27 RX ADMIN — FAMOTIDINE 20 MG: 20 TABLET, FILM COATED ORAL at 08:50

## 2020-12-27 RX ADMIN — TRAMADOL HYDROCHLORIDE 50 MG: 50 TABLET, COATED ORAL at 08:51

## 2020-12-27 RX ADMIN — Medication 10 MG: at 21:20

## 2020-12-27 RX ADMIN — REMDESIVIR 100 MG: 100 INJECTION, POWDER, LYOPHILIZED, FOR SOLUTION INTRAVENOUS at 08:50

## 2020-12-27 RX ADMIN — Medication 500 MG: at 08:50

## 2020-12-27 RX ADMIN — ASPIRIN 81 MG: 81 TABLET, CHEWABLE ORAL at 08:50

## 2020-12-27 RX ADMIN — ZINC SULFATE 220 MG (50 MG) CAPSULE 1 CAPSULE: CAPSULE at 08:51

## 2020-12-27 RX ADMIN — DEXAMETHASONE 6 MG: 4 TABLET ORAL at 08:50

## 2020-12-27 RX ADMIN — TRAMADOL HYDROCHLORIDE 50 MG: 50 TABLET, COATED ORAL at 21:20

## 2020-12-27 RX ADMIN — PROMETHAZINE HYDROCHLORIDE 12.5 MG: 25 TABLET ORAL at 09:14

## 2020-12-27 RX ADMIN — Medication 500 MG: at 21:20

## 2020-12-27 RX ADMIN — GUAIFENESIN AND DEXTROMETHORPHAN 5 ML: 100; 10 SYRUP ORAL at 21:20

## 2020-12-27 RX ADMIN — GUAIFENESIN AND DEXTROMETHORPHAN 5 ML: 100; 10 SYRUP ORAL at 15:50

## 2020-12-27 RX ADMIN — ZINC SULFATE 220 MG (50 MG) CAPSULE 1 CAPSULE: CAPSULE at 21:20

## 2020-12-27 RX ADMIN — ENOXAPARIN SODIUM 30 MG: 30 INJECTION SUBCUTANEOUS at 15:50

## 2020-12-27 RX ADMIN — Medication 10 ML: at 12:01

## 2020-12-27 RX ADMIN — ENOXAPARIN SODIUM 30 MG: 30 INJECTION SUBCUTANEOUS at 05:52

## 2020-12-27 RX ADMIN — GUAIFENESIN AND DEXTROMETHORPHAN 5 ML: 100; 10 SYRUP ORAL at 08:50

## 2020-12-27 NOTE — ROUTINE PROCESS
Bedside shift change report given to Efren Owens RN (oncoming nurse) by Grace Suazo RN (offgoing nurse). Report included the following information SBAR, Kardex, Intake/Output and MAR.

## 2020-12-27 NOTE — PROGRESS NOTES
1915  Assumed care of pt  2109  Shift assessment complete  0100  Reassessment complete   0551  Reassessment complete  patient blood drawn     [address] [place] [address] Cardiac/Medical Night Shift Chart Audit    Chart Audit completed? YES    Bedside and Verbal shift change report given to LISA Eng (oncoming nurse) by Aniyah Black RN (offgoing nurse). Report included the following information SBAR, Kardex, ED Summary, Intake/Output, MAR, Recent Results, Med Rec Status and Cardiac Rhythm NSR.

## 2020-12-27 NOTE — PROGRESS NOTES
Assumed care of pt from [address] [facility] [address] 1054: Morning labs were resent to the lab. 1154: Labs still have not resulted yet.

## 2020-12-28 VITALS
RESPIRATION RATE: 17 BRPM | TEMPERATURE: 98 F | HEIGHT: 72 IN | OXYGEN SATURATION: 100 % | WEIGHT: 278 LBS | DIASTOLIC BLOOD PRESSURE: 79 MMHG | BODY MASS INDEX: 37.65 KG/M2 | SYSTOLIC BLOOD PRESSURE: 134 MMHG | HEART RATE: 50 BPM

## 2020-12-28 LAB
ALBUMIN SERPL-MCNC: 3.1 G/DL (ref 3.4–5)
ALBUMIN/GLOB SERPL: 0.8 {RATIO} (ref 0.8–1.7)
ALP SERPL-CCNC: 48 U/L (ref 45–117)
ALT SERPL-CCNC: 30 U/L (ref 16–61)
ANION GAP SERPL CALC-SCNC: 6 MMOL/L (ref 3–18)
APTT PPP: 31.2 SEC (ref 23–36.4)
AST SERPL-CCNC: 30 U/L (ref 10–38)
BILIRUB SERPL-MCNC: 0.6 MG/DL (ref 0.2–1)
BUN SERPL-MCNC: 20 MG/DL (ref 7–18)
BUN/CREAT SERPL: 22 (ref 12–20)
CALCIUM SERPL-MCNC: 8.8 MG/DL (ref 8.5–10.1)
CHLORIDE SERPL-SCNC: 104 MMOL/L (ref 100–111)
CO2 SERPL-SCNC: 29 MMOL/L (ref 21–32)
CREAT SERPL-MCNC: 0.9 MG/DL (ref 0.6–1.3)
D DIMER PPP FEU-MCNC: <0.27 UG/ML(FEU)
FIBRINOGEN PPP-MCNC: 473 MG/DL (ref 210–451)
GLOBULIN SER CALC-MCNC: 4 G/DL (ref 2–4)
GLUCOSE SERPL-MCNC: 103 MG/DL (ref 74–99)
INR PPP: 1.1 (ref 0.8–1.2)
POTASSIUM SERPL-SCNC: 3.6 MMOL/L (ref 3.5–5.5)
PROT SERPL-MCNC: 7.1 G/DL (ref 6.4–8.2)
PROTHROMBIN TIME: 13.7 SEC (ref 11.5–15.2)
SODIUM SERPL-SCNC: 139 MMOL/L (ref 136–145)

## 2020-12-28 PROCEDURE — 85379 FIBRIN DEGRADATION QUANT: CPT

## 2020-12-28 PROCEDURE — 85610 PROTHROMBIN TIME: CPT

## 2020-12-28 PROCEDURE — 36415 COLL VENOUS BLD VENIPUNCTURE: CPT

## 2020-12-28 PROCEDURE — 85384 FIBRINOGEN ACTIVITY: CPT

## 2020-12-28 PROCEDURE — 74011000258 HC RX REV CODE- 258: Performed by: INTERNAL MEDICINE

## 2020-12-28 PROCEDURE — 74011250636 HC RX REV CODE- 250/636: Performed by: FAMILY MEDICINE

## 2020-12-28 PROCEDURE — 74011000250 HC RX REV CODE- 250: Performed by: INTERNAL MEDICINE

## 2020-12-28 PROCEDURE — 74011250637 HC RX REV CODE- 250/637: Performed by: FAMILY MEDICINE

## 2020-12-28 PROCEDURE — 80053 COMPREHEN METABOLIC PANEL: CPT

## 2020-12-28 PROCEDURE — 85730 THROMBOPLASTIN TIME PARTIAL: CPT

## 2020-12-28 RX ORDER — DEXAMETHASONE 6 MG/1
6 TABLET ORAL
Qty: 5 TAB | Refills: 0 | Status: SHIPPED | OUTPATIENT
Start: 2020-12-28 | End: 2021-01-02

## 2020-12-28 RX ADMIN — ONDANSETRON 4 MG: 2 INJECTION INTRAMUSCULAR; INTRAVENOUS at 08:34

## 2020-12-28 RX ADMIN — REMDESIVIR 100 MG: 100 INJECTION, POWDER, LYOPHILIZED, FOR SOLUTION INTRAVENOUS at 09:00

## 2020-12-28 RX ADMIN — ENOXAPARIN SODIUM 30 MG: 30 INJECTION SUBCUTANEOUS at 04:40

## 2020-12-28 RX ADMIN — Medication 6 TABLET: at 08:19

## 2020-12-28 RX ADMIN — ASPIRIN 81 MG: 81 TABLET, CHEWABLE ORAL at 08:19

## 2020-12-28 RX ADMIN — FAMOTIDINE 20 MG: 20 TABLET, FILM COATED ORAL at 08:19

## 2020-12-28 RX ADMIN — Medication 500 MG: at 08:19

## 2020-12-28 RX ADMIN — Medication 10 ML: at 06:00

## 2020-12-28 RX ADMIN — ZINC SULFATE 220 MG (50 MG) CAPSULE 1 CAPSULE: CAPSULE at 08:19

## 2020-12-28 RX ADMIN — DEXAMETHASONE 6 MG: 4 TABLET ORAL at 08:19

## 2020-12-28 NOTE — PROGRESS NOTES
1915  Assumed care of pt   2123  Shift assessment complete  0114  Reassessment complete  0442  Reassessment complete    Shift uneventful    [facility] Cardiac/Medical Night Shift Chart Audit    Chart Audit completed? YES        Bedside and Verbal shift change report given to Mio So RN (oncoming nurse) by Devonte Hardin RN (offgoing nurse). Report included the following information SBAR, Kardex, ED Summary, Intake/Output, MAR, Recent Results, Med Rec Status and Cardiac Rhythm NSR.

## 2020-12-28 NOTE — DISCHARGE INSTRUCTIONS
Patient Education        10 Things to Do When You Have COVID-19    Stay home. Don't go to school, work, or public areas. And don't use public transportation, ride-shares, or taxis unless you have no choice. Leave your home only if you need to get medical care. But call the doctor's office first so they know you're coming. And wear a cloth face cover. Ask before leaving isolation. Talk with your doctor or other health professional about when it will be safe for you to leave isolation. Wear a cloth face cover when you are around other people. It can help stop the spread of the virus when you cough or sneeze. Limit contact with people in your home. If possible, stay in a separate bedroom and use a separate bathroom. Avoid contact with pets and other animals. If possible, have a friend or family member care for them while you're sick. Cover your mouth and nose with a tissue when you cough or sneeze. Then throw the tissue in the trash right away. Wash your hands often, especially after you cough or sneeze. Use soap and water, and scrub for at least 20 seconds. If soap and water aren't available, use an alcohol-based hand . Don't share personal household items. These include bedding, towels, cups and glasses, and eating utensils. Clean and disinfect your home every day. Use household  or disinfectant wipes or sprays. Take special care to clean things that you grab with your hands. These include doorknobs, remote controls, phones, and handles on your refrigerator and microwave. And don't forget countertops, tabletops, bathrooms, and computer keyboards. Take acetaminophen (Tylenol) to relieve fever and body aches. Read and follow all instructions on the label. Current as of: July 10, 2020               Content Version: 12.6  © 2006-2020 [employer].    Care instructions adapted under license by [employer] (which disclaims liability or warranty for this information). If you have questions about a medical condition or this instruction, always ask your healthcare professional. [place] [address] any warranty or liability for your use of this information. Patient Education        Coronavirus ([id number]): Care Instructions  Overview  The coronavirus disease (COVID-19) is caused by a virus. Symptoms may include a fever, a cough, and shortness of breath. It mainly spreads person-to-person through droplets from coughing and sneezing. The virus also can spread when people are in close contact with someone who is infected. Most people have mild symptoms and can take care of themselves at home. If their symptoms get worse, they may need care in a hospital. Treatment may include medicines to reduce symptoms, plus breathing support such as oxygen therapy or a ventilator. It's important to not spread the virus to others. If you have COVID-19, wear a face cover anytime you are around other people. You need to isolate yourself while you are sick. Leave your home only if you need to get medical care or testing. Follow-up care is a key part of your treatment and safety. Be sure to make and go to all appointments, and call your doctor if you are having problems. It's also a good idea to know your test results and keep a list of the medicines you take. How can you care for yourself at home? · Get extra rest. It can help you feel better. · Drink plenty of fluids. This helps replace fluids lost from fever. Fluids also help ease a scratchy throat. Water, soup, fruit juice, and hot tea with lemon are good choices. · Take acetaminophen (such as Tylenol) to reduce a fever. It may also help with muscle aches. Read and follow all instructions on the label. · Use petroleum jelly on sore skin. This can help if the skin around your nose and lips becomes sore from rubbing a lot with tissues.   Tips for self-isolation  · Limit contact with people in your home. If possible, stay in a separate bedroom and use a separate bathroom.  · Wear a cloth face cover when you are around other people. It can help stop the spread of the virus when you cough or sneeze.  · If you have to leave home, avoid crowds and try to stay at least 6 feet away from other people.  · Avoid contact with pets and other animals.  · Cover your mouth and nose with a tissue when you cough or sneeze. Then throw it in the trash right away.  · Wash your hands often, especially after you cough or sneeze. Use soap and water, and scrub for at least 20 seconds. If soap and water aren't available, use an alcohol-based hand .  · Don't share personal household items. These include bedding, towels, cups and glasses, and eating utensils.  · Wash laundry in the warmest water allowed for the fabric type, and dry it completely. It's okay to wash other people's laundry with yours.  · Clean and disinfect your home every day. Use household  and disinfectant wipes or sprays. Take special care to clean things that you grab with your hands. These include doorknobs, remote controls, phones, and handles on your refrigerator and microwave. And don't forget countertops, tabletops, bathrooms, and computer keyboards.  When you can end self-isolation  · If you know or suspect that you have COVID-19, stay in self-isolation until:  ? You haven't had a fever for 3 days, and  ? Your symptoms have improved, and  ? It's been at least 10 days since your symptoms started.  · Talk to your doctor about whether you also need testing, especially if you have a weakened immune system.  When should you call for help?   Call 911 anytime you think you may need emergency care. For example, call if you have life-threatening symptoms, such as:    · You have severe trouble breathing. (You can't talk at all.)     · You have constant chest pain or pressure.     · You are severely dizzy or lightheaded.     · You are confused or  can't think clearly.     · Your face and lips have a blue color.     · You pass out (lose consciousness) or are very hard to wake up. Call your doctor now or seek immediate medical care if:    · You have moderate trouble breathing. (You can't speak a full sentence.)     · You are coughing up blood (more than about 1 teaspoon).     · You have signs of low blood pressure. These include feeling lightheaded; being too weak to stand; and having cold, pale, clammy skin. Watch closely for changes in your health, and be sure to contact your doctor if:    · Your symptoms get worse.     · You are not getting better as expected. Call before you go to the doctor's office. Follow their instructions. And wear a cloth face cover. Current as of: July 10, 2020               Content Version: 12.6  © 3956-5482 [employer]. Care instructions adapted under license by [employer] (which disclaims liability or warranty for this information). If you have questions about a medical condition or this instruction, always ask your healthcare professional. [place] [address] any warranty or liability for your use of this information. Patient Education        Learning About Coronavirus [phone])  Coronavirus [phone]): Overview  What is coronavirus ([id number])? The coronavirus disease (COVID-19) is caused by a virus. It is an illness that was first found in December 2019. It has since spread worldwide. The virus can cause fever, cough, and trouble breathing. In severe cases, it can cause pneumonia and make it hard to breathe without help. It can cause death. This virus spreads person-to-person through droplets from coughing and sneezing. It can also spread when you are close to someone who is infected. And it can spread when you touch something that has the virus on it, such as a doorknob or a tabletop. Coronaviruses are a large group of viruses. They cause the common cold.  They also cause more serious illnesses like Middle East respiratory syndrome (MERS) and severe acute respiratory syndrome (SARS). COVID-19 is caused by a novel coronavirus. That means it's a new type that has not been seen in people before. How is COVID-19 treated? Mild illness can be treated at home, but more serious illness needs to be treated in the hospital. Treatment may include medicines to reduce symptoms, plus breathing support such as oxygen therapy or a ventilator. Other treatments, such as antiviral medicines, may help people who have COVID-19. What can you do to protect yourself from COVID-19? The best way to protect yourself from getting sick is to:  · Avoid areas where there is an outbreak. · Avoid contact with people who may be infected. · Avoid crowds and try to stay at least 6 feet away from other people. · Wash your hands often, especially after you cough or sneeze. Use soap and water, and scrub for at least 20 seconds. If soap and water aren't available, use an alcohol-based hand . · Avoid touching your mouth, nose, and eyes. What can you do to avoid spreading the virus to others? To help avoid spreading the virus to others:  · Wash your hands often with soap or alcohol-based hand sanitizers. · Cover your mouth with a tissue when you cough or sneeze. Then throw the tissue in the trash. · Use a disinfectant to clean things that you touch often. These include doorknobs, remote controls, phones, and handles on your refrigerator and microwave. And don't forget countertops, tabletops, bathrooms, and computer keyboards. · Wear a cloth face cover if you have to go to public areas. If you know or suspect that you have COVID-19:  · Stay home. Don't go to school, work, or public areas. And don't use public transportation, ride-shares, or taxis unless you have no choice. · Leave your home only if you need to get medical care or testing.  But call the doctor's office first so they know you're coming. And wear a face cover. · Limit contact with people in your home. If possible, stay in a separate bedroom and use a separate bathroom. · Wear a face cover whenever you're around other people. It can help stop the spread of the virus when you cough or sneeze. · Clean and disinfect your home every day. Use household  and disinfectant wipes or sprays. Take special care to clean things that you grab with your hands. · Self-isolate until it's safe to be around others again. ? If you have symptoms, it's safe when you haven't had a fever for 3 days and your symptoms have improved and it's been at least 10 days since your symptoms started. ? If you were exposed to the virus but don't have symptoms, it's safe to be around others 14 days after exposure. ? Talk to your doctor about whether you also need testing, especially if you have a weakened immune system. When to call for help  Call 911 anytime you think you may need emergency care. For example, call if:  · You have severe trouble breathing. (You can't talk at all.)  · You have constant chest pain or pressure. · You are severely dizzy or lightheaded. · You are confused or can't think clearly. · Your face and lips have a blue color. · You passed out (lost consciousness) or are very hard to wake up. Call your doctor now if you develop symptoms such as:  · Shortness of breath. · Fever. · Cough. If you need to get care, call ahead to the doctor's office for instructions before you go. Make sure you wear a face cover to prevent exposing other people to the virus. Where can you get the latest information? The following health organizations are tracking and studying this virus. Their websites contain the most up-to-date information. James Cuadra also learn what to do if you think you may have been exposed to the virus. · U.S. Centers for Disease Control and Prevention (CDC): The CDC provides updated news about the disease and travel advice.  The website also tells you how to prevent the spread of infection. www.cdc.gov  · World Health Organization [facility]): WHO offers information about the virus outbreaks. WHO also has travel advice. www.who.int  Current as of: July 10, 2020               Content Version: 12.6  © 3326-7846 [employer]. Care instructions adapted under license by [employer] (which disclaims liability or warranty for this information). If you have questions about a medical condition or this instruction, always ask your healthcare professional. [address] any warranty or liability for your use of this information.

## 2020-12-28 NOTE — DISCHARGE SUMMARY
Discharge Summary    Patient: Blaze Romo MRN: [id number]  CSN: [id number]    YOB: 1965  Age: 54 y.o. Sex: male    DOA: 12/23/2020 LOS:  LOS: 4 days   Discharge Date: 12/28/2020     Primary Care Provider:  Brennen Porter MD    Admission Diagnoses: COVID-19 virus infection [U07.1]  Hypoxia [R09.02]    Discharge Diagnoses:    Problem List as of 12/28/2020 Date Reviewed: 12/24/2020          Codes Class Noted - Resolved    Male erectile dysfunction, unspecified ICD-10-CM: N52.9  ICD-9-CM: 607.84  9/4/2019 - Present        Hypoxia ICD-10-CM: R09.02  ICD-9-CM: 799.02  12/24/2020 - Present        * (Principal) COVID-19 virus infection ICD-10-CM: U07.1  ICD-9-CM: 079.89  12/24/2020 - Present        Obesity (BMI 30-39. 9) ICD-10-CM: E66.9  ICD-9-CM: 278.00  12/24/2020 - Present              Discharge Medications:     Discharge Medication List as of 12/28/2020 11:39 AM          Discharge Condition: Good    Procedures : None    Consults: Infectious Disease      PHYSICAL EXAM   Visit Vitals  /79   Pulse (!) 50   Temp 98 °F (36.7 °C)   Resp 17   Ht 6' (1.829 m)   Wt 126.1 kg (278 lb)   SpO2 100%   BMI 37.70 kg/m²     General: Awake, cooperative, no acute distress    HEENT: NC, Atraumatic. PERRLA, EOMI. Anicteric sclerae. Lungs:  CTA Bilaterally. No Wheezing/Rhonchi/Rales. Heart:  Regular  rhythm,  No murmur, No Rubs, No Gallops  Abdomen: Soft, Non distended, Non tender. +Bowel sounds,   Extremities: No c/c/e  Psych:   Not anxious or agitated. Neurologic:  No acute neurological deficits. Admission HPI :   Blaze Romo is a 54 y.o. male who has obesity who presents to the ED with worsening shortness of breath today in the setting of a COVID-19 infection. He started having symptoms initially on 12/18. He got the infection from his coworker who was asymptomatic. His coworker's brother gave it to his coworker over a Thanksgiving get-together.  He was admitted on 12/20 overnight in [place], [place] (was there for work) but was not hypoxic so did not receive any treatment. He lives locally so he drove back home yesterday after being discharged. He has had cough, fever, and abdominal discomfort.        Hospital Course :   Mr. Alma Perez was admitted to [facility]- unit, he was seen and followed by infectious disease. He was started on vitamin/antioxidant cocktail, dexamethasone. He received convalescent plasma and 5-day course of remdesivir. He initially required oxygen, later his oxygen weaned off. He is now saturating well above 94% on room air he is ambulating without any problem. He will follow-up with his primary care physician. Activity: Activity as tolerated    Diet: Regular Diet    Follow-up: PCP    Disposition: home    Minutes spent on discharge: 45       Labs: Results:       Chemistry Recent Labs     12/28/20  0507 12/27/20  1050 12/26/20  0212   * 122* 105*    139 139   K 3.6 3.6 4.0    103 104   CO2 29 31 30   BUN 20* 19* 19*   CREA 0.90 1.01 1.00   CA 8.8 8.4* 8.6   AGAP 6 5 5   BUCR 22* 19 19   AP 48 48 51   TP 7.1 7.0 7.0   ALB 3.1* 3.1* 3.1*   GLOB 4.0 3.9 3.9   AGRAT 0.8 0.8 0.8      CBC w/Diff Recent Labs     12/27/20  1050 12/26/20  0212   WBC 4.0* 5.1   RBC 4.44* 4.52*   HGB 13.6 13.9   HCT 38.4 39.3    157   GRANS 34* 48   LYMPH 58* 47   EOS 0 0      Cardiac Enzymes No results for input(s): CPK, CKND1, ROMARIO in the last 72 hours. No lab exists for component: CKRMB, TROIP   Coagulation Recent Labs     12/28/20  0507 12/27/20  1050   PTP 13.7 14.4   INR 1.1 1.1   APTT 31.2 32.5       Lipid Panel No results found for: CHOL, CHOLPOCT, CHOLX, CHLST, CHOLV, 571596, HDL, HDLP, LDL, LDLC, DLDLP, 191489, VLDLC, VLDL, TGLX, TRIGL, TRIGP, TGLPOCT, CHHD, CHHDX   BNP No results for input(s): BNPP in the last 72 hours.    Liver Enzymes Recent Labs     12/28/20  0507   TP 7.1   ALB 3.1*   AP 48      Thyroid Studies No results found for: T4, T3U, TSH, TSHEXT         Significant Diagnostic Studies: Xr Chest Port    Result Date: 12/24/2020  EXAM: One-view chest CLINICAL HISTORY: Shortness of breath, meets SIRS criteria , COMPARISON: None FINDINGS: Frontal view of the chest demonstrate minimal confluent opacities in the right lower lobe otherwise clear. . Cardiac silhouette is normal in size and contour. No acute bony or soft tissue abnormality. IMPRESSION: Nonspecific small focus of airspace disease in the right lower lobe. Developing pneumonia not excluded. Recommend follow-up. No results found for this or any previous visit. Please note that this dictation was completed with [employer] the [email or web address] voice recognition software. Quite often unanticipated grammatical, syntax, homophones, and other interpretive errors are inadvertently transcribed by the computer software. Please disregard these errors. Please excuse any errors that have escaped final proofreading.      CC: Kendell Olivares MD

## 2020-12-28 NOTE — PROGRESS NOTES
Assumed patient care at this time. Received report from [facility] complete and documented in flowsheets. Pt is on room air stating at 100%. Pt is eager to go home. 0834-Gave PRN Zofran for complaints of nausea. 0854-Paged Dr. Stephanie Vargas for patient wanting to go home after antibiotic.     0920-Paged Dr. Stephanie Vargas for patient stating that his ride will be here at 6.     0904-Dr. Stephanie Vargas stated that he will be here before then.

## 2020-12-28 NOTE — PROGRESS NOTES
Dual AVS reviewed with Alvin GONZALEZ. All medications reviewed individually with patient. Opportunities for questions and concerns provided. Patient to be discharged via (mode of transport ie. Car, ambulance or air transport) car. Patient's arm band appropriately discarded.

## 2020-12-28 NOTE — PROGRESS NOTES
Hospitalist Progress Note    Patient: Donny Morris MRN: [id number]  CSN: [id number]    YOB: 1965  Age: 54 y.o. Sex: male    DOA: 12/23/2020 LOS:  LOS: 3 days                Assessment/Plan     Patient Active Problem List   Diagnosis Code    Male erectile dysfunction, unspecified N52.9    Hypoxia R09.02    COVID-19 virus infection U07.1    Obesity (BMI 30-39. 9) E66.9            55 y/o male with obesity admitted for COVID-19 infection with hypoxia. Feels better, wants to go home. COVID 19 pneumonia  Continue with vitamin/antioxidant cocktail. Dexamethasone  Convalescent plasma  remdesivir   lovenox  Seen by ID    Disposition : TBD    Review of systems  General: No fevers or chills. Cardiovascular: No chest pain or pressure. No palpitations. Pulmonary: No shortness of breath. Gastrointestinal: No nausea, vomiting. Physical Exam:  General: Awake, cooperative, no acute distress    HEENT: NC, Atraumatic. PERRLA, anicteric sclerae. Lungs: CTA Bilaterally. No Wheezing/Rhonchi/Rales. Heart:  S1 S2,  No murmur, No Rubs, No Gallops  Abdomen: Soft, Non distended, Non tender.  +Bowel sounds,   Extremities: No c/c/e  Psych:   Not anxious or agitated. Neurologic:  No acute neurological deficit. Vital signs/Intake and Output:  Visit Vitals  /77   Pulse (!) 58   Temp 97.1 °F (36.2 °C)   Resp 18   Ht 6' (1.829 m)   Wt 126.1 kg (278 lb)   SpO2 95%   BMI 37.70 kg/m²     Current Shift:  No intake/output data recorded.   Last three shifts:  12/26 0701 - 12/27 1900  In: 250 [I.V.:250]  Out: -             Labs: Results:       Chemistry Recent Labs     12/27/20  1050 12/26/20  0212 12/25/20  0117   * 105* 128*    139 139   K 3.6 4.0 4.1    104 104   CO2 31 30 29   BUN 19* 19* 20*   CREA 1.01 1.00 1.13   CA 8.4* 8.6 8.9   AGAP 5 5 6   BUCR 19 19 18   AP 48 51 50   TP 7.0 7.0 7.1   ALB 3.1* 3.1* 3.4   GLOB 3.9 3.9 3.7   AGRAT 0.8 0.8 0.9      CBC w/Diff Recent Labs     12/27/20  1050 12/26/20  0212 12/25/20  0117   WBC 4.0* 5.1 7.3   RBC 4.44* 4.52* 4.43*   HGB 13.6 13.9 13.8   HCT 38.4 39.3 38.7    157 151   GRANS 34* 48 63   LYMPH 58* 47 33   EOS 0 0 0      Cardiac Enzymes No results for input(s): CPK, CKND1, ROMARIO in the last 72 hours. No lab exists for component: CKRMB, TROIP   Coagulation Recent Labs     12/27/20  1050 12/26/20  0212   PTP 14.4 14.1   INR 1.1 1.1   APTT 32.5 33.6       Lipid Panel No results found for: CHOL, CHOLPOCT, CHOLX, CHLST, CHOLV, 194700, HDL, HDLP, LDL, LDLC, DLDLP, 543892, VLDLC, VLDL, TGLX, TRIGL, TRIGP, TGLPOCT, CHHD, CHHDX   BNP No results for input(s): BNPP in the last 72 hours.    Liver Enzymes Recent Labs     12/27/20  1050   TP 7.0   ALB 3.1*   AP 48      Thyroid Studies No results found for: T4, T3U, TSH, TSHEXT, TSHEXT     Procedures/imaging: see electronic medical records for all procedures/Xrays and details which were not copied into this note but were reviewed prior to creation of Plan

## 2020-12-28 NOTE — ROUTINE PROCESS
Bedside shift change report given to Carmen Mejia (oncoming nurse) by Marisa Levy RN (offgoing nurse). Report included the following information SBAR, Kardex, Intake/Output and MAR.

## 2020-12-28 NOTE — PROGRESS NOTES
Problem: Patient Education: Go to Patient Education Activity  Goal: Patient/Family Education  Outcome: Progressing Towards Goal     Problem: Airway Clearance - Ineffective  Goal: Achieve or maintain patent airway  Outcome: Progressing Towards Goal     Problem: Gas Exchange - Impaired  Goal: Absence of hypoxia  Outcome: Progressing Towards Goal  Goal: Promote optimal lung function  Outcome: Progressing Towards Goal     Problem: Breathing Pattern - Ineffective  Goal: Ability to achieve and maintain a regular respiratory rate  Outcome: Progressing Towards Goal     Problem:  Body Temperature -  Risk of, Imbalanced  Goal: Ability to maintain a body temperature within defined limits  Outcome: Progressing Towards Goal  Goal: Will regain or maintain usual level of consciousness  Outcome: Progressing Towards Goal  Goal: Complications related to the disease process, condition or treatment will be avoided or minimized  Outcome: Progressing Towards Goal     Problem: Isolation Precautions - Risk of Spread of Infection  Goal: Prevent transmission of infectious organism to others  Outcome: Progressing Towards Goal     Problem: Nutrition Deficits  Goal: Optimize nutrtional status  Outcome: Progressing Towards Goal     Problem: Risk for Fluid Volume Deficit  Goal: Maintain normal heart rhythm  Outcome: Progressing Towards Goal  Goal: Maintain absence of muscle cramping  Outcome: Progressing Towards Goal  Goal: Maintain normal serum potassium, sodium, calcium, phosphorus, and pH  Outcome: Progressing Towards Goal     Problem: Loneliness or Risk for Loneliness  Goal: Demonstrate positive use of time alone when socialization is not possible  Outcome: Progressing Towards Goal     Problem: Fatigue  Goal: Verbalize increase energy and improved vitality  Outcome: Progressing Towards Goal     Problem: Patient Education: Go to Patient Education Activity  Goal: Patient/Family Education  Outcome: Progressing Towards Goal     Problem: General Medical Care Plan  Goal: *Vital signs within specified parameters  Outcome: Progressing Towards Goal  Goal: *Labs within defined limits  Outcome: Progressing Towards Goal  Goal: *Absence of infection signs and symptoms  Outcome: Progressing Towards Goal  Goal: *Optimal pain control at patient's stated goal  Outcome: Progressing Towards Goal  Goal: *Skin integrity maintained  Outcome: Progressing Towards Goal  Goal: *Fluid volume balance  Outcome: Progressing Towards Goal  Goal: *Optimize nutritional status  Outcome: Progressing Towards Goal  Goal: *Anxiety reduced or absent  Outcome: Progressing Towards Goal  Goal: *Progressive mobility and function (eg: ADL's)  Outcome: Progressing Towards Goal     Problem: Patient Education: Go to Patient Education Activity  Goal: Patient/Family Education  Outcome: Progressing Towards Goal     Problem: Pain  Goal: *Control of Pain  Outcome: Progressing Towards Goal  Goal: *PALLIATIVE CARE:  Alleviation of Pain  Outcome: Progressing Towards Goal     Problem: Patient Education: Go to Patient Education Activity  Goal: Patient/Family Education  Outcome: Progressing Towards Goal     Problem:  Activity Intolerance  Goal: *Oxygen saturation during activity within specified parameters  Outcome: Progressing Towards Goal  Goal: *Able to remain out of bed as prescribed  Outcome: Progressing Towards Goal     Problem: Patient Education: Go to Patient Education Activity  Goal: Patient/Family Education  Outcome: Progressing Towards Goal     Problem: Breathing Pattern - Ineffective  Goal: *Absence of hypoxia  Outcome: Progressing Towards Goal  Goal: *Use of effective breathing techniques  Outcome: Progressing Towards Goal  Goal: *PALLIATIVE CARE:  Alleviation of Dyspnea  Outcome: Progressing Towards Goal     Problem: Patient Education: Go to Patient Education Activity  Goal: Patient/Family Education  Outcome: Progressing Towards Goal

## 2020-12-29 ENCOUNTER — PATIENT OUTREACH (OUTPATIENT)
Dept: CASE MANAGEMENT | Age: 55
End: 2020-12-29

## 2020-12-29 LAB
BACTERIA SPEC CULT: NORMAL
BACTERIA SPEC CULT: NORMAL
SERVICE CMNT-IMP: NORMAL
SERVICE CMNT-IMP: NORMAL

## 2020-12-29 NOTE — PROGRESS NOTES
CTN made two separate attempts to contact patient on 12/29/20 to complete COVID care transitions follow up. Unable to reach. Left HIPAA compliant [facility] requesting a return call. Resolving COVID Care Transitions episode. Patient has CTN's contact information.

## 2022-12-04 NOTE — ADT AUTH CERT NOTES
[facility] 
  
FACILITY NPI : [id number]  
[address] 
[address] [place] [address] Drive [address] [phone] 
  
  
   
Patient Name :Donny Morris  : 1965 (55 yrs) MRN : [id number] 
  
Patient Mailing Address [address] [facility] [47] , [id number]        
  
  . 
  
   
Insurance Plan Payor: BLUE CROSS / Plan: [address] [place] [address] / Product Type: PPO /  
  
Primary Coverage Subscriber ID : [id number] 
  
Secondary Coverage:  N/A 
  
Secondary Subscriber ID :   
   
Current Patient Class : INPATIENT Admit Date : 2020 
  
REQUESTED LEVEL OF CARE: INPATIENT [101] Diagnosis : COVID-19 virus infection; Hypoxia 
                    
  
ICD10 Code : JORUP-24 virus infection [U07.1] Hypoxia [R09.02] 
  
Current Room and Bed 357/01 
  
Admitting and Attending Info: 
Admitting Provider : Govind Lopez MD   NPI: [id number] Admitting Provider Phone. [phone] Admitting Provider Address: SAME AS FACILITY  
  
Attending Provider Phone: Attending phys phone: N/A Facility Name: [facility] Patient Demographics Patient Name Lavonda Boxer Sex Male   
1965 Address [address] [address] Phone [phone] (Home) [phone] (Mobile) Hospital Account Name Acct ID Class Status Primary Coverage Lavonda Boxer [id number] INPATIENT Discharged/Not [id number] [facility] Guarantor Account (for Hospital Account [de-identified]) Name Relation to Pt Service Area Active? Acct Type Lavonda Boxer Self [address] Yes Personal/Family Address Phone [address] [place], [facility] [address] [phone]) Coverage Information (for Hospital Account [de-identified]) F/O Payor/Plan Subscriber  Subscriber Sex Precert # [place]OSS/[place] OUT OF STATE 65 Tressa Lizz Subscriber Subscriber # Richardson Bell [id number] Grp # Group Name 
 
 
[id number] Address Phone [id number] [place], [address] Policy Number 
 
Status Effective Date Benefits Phone [id number] -  - Auth/Cert Diagnosis Codes Comments COVID-19 virus infection  ICD-10-CM: U07.1 ICD-9-CM: 079.89 Admission Information Arrival Date/Time: 2020 Admit Date/Time: 2020 2100 IP Adm. Date/Time: 2020 0010 Admission Type: Emergency Point of Origin: Non-health Care Facility/self Admit Category:   
Means of Arrival: Car Primary Service: Medicine Secondary Service: N/A Transfer Source:  Service Area: [address] [place] [address] Unit: THE [address] CARDIAC/MEDICAL Admit Provider: Angi Nicholas MD Attending Provider: Rosalba Spain DO Referring Provider:   
 
 
 
 
 
Admission Information Attending Provider Admission Dx Admitted on COVID-19 virus infection, Hypoxia 20 Service Isolation Code Status MEDICINE Droplet Plus, Droplet Plus Full Code Allergies Advance Care Planning No Known Allergies Jump to the Activity Admission Information Unit/Bed: THE [address] CARD/MED/01 Service: MEDICINE Admitting provider: Angi Nicholas MD Phone: [phone] Attending provider:  Phone: PCP: Brennen Porter MD Phone: [phone] Admission dx:  Patient class: I Admission type: ER    
 
 
 
 
 
Patient Demographics Patient Name Richardson Bell [facility]  
[id number] Sex Male   
1965 Address [address] [address] Phone [phone] (Home) [phone] (Mobile) H&P Notes H&P by Winter Peters MD at 20 0259 documented on ED to Hosp-Admission (Discharged) from 2020 in [address] Author: Winter Peters MD Author Type: Physician Filed: 20 Note Status: Addendum Cosign: Cosign Not Required Date of Service: 20 8732 : Winter Peters MD (Physician) Prior Versions: 1. Winter Peters MD (Physician) at 20 - Signed Expand AllCollapse All   
 
 
 
 
 
 
  
untitled image History & Physical 
 
  
 
 
Patient: Neno Styles MRN: [id number] CSN: [id number] YOB: 1965 Age: 54 y.o. Sex: male DOA: 2020 Primary Care Provider:  Referred, Self, MD 
 
  
 
  
 
 
Assessment/Plan Patient Active Problem List  
 
 
Diagnosis Code  Male erectile dysfunction, unspecified N52.9  Hypoxia R09.02  
 
 
 COVID-19 virus infection U07.1  Obesity (BMI 30-39. 9)  
 
E66.9  
 
 
  
 
53 y/o male with obesity admitted for COVID-19 infection with hypoxia. COVID-19 infection 
 
-admit to isolation unit 
 
-nasal cannula oxygen 
 
-prone positioning as tolerated 
 
-dexamethasone 
 
-antioxidant cocktail 
 
-daily ASA 
 
-daily COVID labs 
 
-convalescent plasma (consent signed) -ID consult for consideration of remdesivir Obesity-comorbid condition increasing hospitalization rate with COVID infection 
 
-aggressive lifestyle intervention needed 
 
-follow up hemoglobin A1C to screen for diabetes Full code Prophylaxis: pepcid PO, lovenox SQ Estimated length of stay : 3-4 nights Sue Luna MD 
 
Nocturnist 
 
 ---------------------------------------------------------------------------------------------------------------------------------------------------------------------------------------------------------------- 
 
CC: COVID infection HPI:  
 
 
  
 
Katharine Dozier is a 54 y.o. male who has obesity who presents to the ED with worsening shortness of breath today in the setting of a COVID-19 infection. He started having symptoms initially on 12/18. He got the infection from his coworker who was asymptomatic. His coworker's brother gave it to his coworker over a Thanksgiving get-together. He was admitted on 12/20 overnight in [address] (was there for work) but was not hypoxic so did not receive any treatment. He lives locally so he drove back home yesterday after being discharged. He has had cough, fever, and abdominal discomfort. Past Medical History:  
 
 
Diagnosis Date  Obesity (BMI 30-39. 9) History reviewed. No pertinent surgical history. Family History Problem Relation Age of Onset  Heart Disease Mother   
 
Diabetes Mother   
 
Diabetes Sister   
 
Stroke Sister Social History Prior to Admission medications Not on File No Known Allergies Review of Systems Gen: +fever, chills, malaise Heent: No headache, rhinorrhea, sore throat. Heart: No chest pain, palpitations, LANDERS, pnd, or orthopnea. Resp: +cough, wheezing and shortness of breath. GI: No nausea, vomiting, diarrhea,  
 
: No urinary obstruction, dysuria or hematuria. Derm: No rash, new skin lesion or pruritis. Musc/skeletal: no bone or joint complaints. Vasc: No edema, cyanosis or claudication. Endo: No heat/cold intolerance, no polyuria,polydipsia or polyphagia. Neuro: No unilateral weakness, numbness, tingling. No seizures. Heme: No easy bruising or bleeding. Physical Exam:  
 
 
  
 
Physical Exam: 
 
Visit Vitals BP  
 
118/82 (BP 1 Location: Right arm, BP Patient Position: At rest;Sitting) Pulse 80 Temp 98.7 °F (37.1 °C) Resp  
 
20 Ht 6' (1.829 m) Wt  
 
126.1 kg (278 lb) SpO2  
 
95% BMI  
 
37.70 kg/m² O2 Flow Rate (L/min): 2 l/min O2 Device: Nasal cannula Temp (24hrs), Av.1 °F (37.3 °C), Min:97.1 °F (36.2 °C), Max:101.6 °F (38.7 °C) 
 
   190 -  0700 In: 1000 [I.V.:1000] Out: -    No intake/output data recorded. General:   
 
Awake, ill-appearing, mildly tachypneic, cooperative. Head:   
 
Normocephalic, without obvious abnormality, atraumatic. Eyes:   
 
Conjunctivae/corneas clear, sclera anicteric. Neck:  
 
Supple, symmetrical, trachea midline. Lungs:    
 
Coarse breath sounds in right middle lung, no wheezing. Heart:   
 
Regular rate and rhythm, S1, S2 normal, no murmur, click, rub or gallop. Abdomen:  
 
Soft, non-tender. Bowel sounds normal. No masses,  No organomegaly. Extremities:  
 
Extremities normal, atraumatic, no cyanosis or edema. Capillary refill normal.  
 
 
Pulses:  
 
2+ and symmetric all extremities. Skin:  
 
Skin color pink, turgor normal. No rashes or lesions Neurologic:  
 
No focal motor or sensory deficit. Labs Reviewed: All lab results for the last 24 hours reviewed. Recent Results CXR pending Patient Demographics Patient Name Teofilo Robbins [facility]  
[id number] Sex Male   
1965 [facility] [address] Phone [phone] (Home) [phone] (Mobile) CSN: 
 
 
 [id number] Admit Date: 
 
Admit Time Room Bed Dec 23, 2020  9:00 PM (10) [phone] Attending Providers Provider Pager From To She Gaytan DO  12/23/20 12/24/20 Yoana Meyer MD  12/24/20 12/28/20 Emergency Contact(s) Name Relation Home Work Mobile Josh Meneses, [address] [place] [address]  [phone] Utilization Reviews Pneumonia - Care Day 2 (12/25/2020) by Shannon Lundy RN Review Entered Review Status 12/28/2020 12:05 Completed Criteria Review Care Day: 2 Care Date: 12/25/2020 Level of Care: Telemetry Guideline Day 2 Clinical Status  
  
(X) * No CO2 retention or acidosis 12/28/2020 12:05:48 EST by Casey Brown  
  
  No ABG's on file  
  
(X) * No requirement for mechanical ventilation 12/28/2020 12:05:48 EST by Casey Brown  
  
  n/a (X) * Hypotension absent 12/28/2020 12:05:48 EST by Casey Brown  
  
  99.5 P-86 R-16 126/80 95% on 2L-NC  
  
(X) * Afebrile or fever improved 12/28/2020 12:05:48 EST by Casey Brown  
  
  WNL  
  
(X) * No hypoxia on room air or oxygenation improved 12/28/2020 12:05:48 EST by Casey Brown  
  
  Stable on 2L-NC  
  
(X) * Mental status improved or at baseline 12/28/2020 12:05:48 EST by Casey Brown  
  
  Awake and alert Activity  
  
(X) * Increased activity 12/28/2020 12:05:48 EST by Casey Brown  
  
  Activity as tolerated Routes  
  
(X) Oral hydration, medications 12/28/2020 12:05:48 EST by Casey Brown  
  
  Vit C 500mg po BID, ASA 81mg po qd, Vit D3 6000u po qd, Decadron 6mg po qd, Lovenox 40mg sq q24, Melatonin 10mg po qhs, Zofran 4mg IV prn, Zinc 50mg po q12, Remdesivir 100mg IV q24  
  
(X) Usual diet 12/28/2020 12:05:48 EST by Casey Brown  
  
  Regular diet Interventions (X) Pulse oximetry 12/28/2020 12:05:48 EST by Dav Gallego  
  
  Yes  
  
(X) Possible oxygen 12/28/2020 12:05:48 EST by Dav Gallego  
  
  2L-NC Medications (X) IV or oral antibiotics 12/28/2020 12:05:48 EST by Dav Gallego  
  
  Zithromax 500mg IV q24  
  
* Milestone Additional Notes 12/25/20 Assessment/Plan Patient Active Problem List  
Diagnosis Code · Male erectile dysfunction, unspecified N52.9 · Hypoxia R09.02  
· COVID-19 virus infection U07.1 · Obesity (BMI 30-39. 9) E66.9  
   
55 y/o male with obesity admitted for COVID-19 infection with hypoxia. COVID 19 pneumonia Continue with vitamin/antioxidant cocktail. Dexamethasone Convalescent plasma  
remdesivir   
lovenox Seen by ID Disposition : TBD Review of systems General: No fevers or chills. Cardiovascular: No chest pain or pressure. No palpitations. Pulmonary: No shortness of breath. Gastrointestinal: No nausea, vomiting. Physical Exam:  
General:         Awake, cooperative, no acute distress HEENT:           NC, Atraumatic. PERRLA, anicteric sclerae. Lungs:            CTA Bilaterally. No Wheezing/Rhonchi/Rales. Heart:              S1 S2,  No murmur, No Rubs, No Gallops Abdomen:      Soft, Non distended, Non tender. +Bowel sounds, Extremities:   No c/c/e Psych:            Not anxious or agitated. Neurologic:    No acute neurological deficit. COVID 19 by Lenore Patrick RN Review Entered Review Status 12/27/2020 18:27 In Primary Criteria Review Is the illness suspected to be related to the Coronavirus (COVID-19)? Yes Has the member been tested for the COVID-19? Yes If Yes, what are the results of the COVID-19? Positive What is the severity of the members condition (i.e. Isolation, Ventilator use)?   
 
Droplet plus, 2L NC  
 
 
  
 
 
 
 Opt out Pneumonia - Care Day 1 (12/24/2020) by Carmela Durham RN Review Entered Review Status 12/27/2020 18:26 Completed Criteria Review Care Day: 1 Care Date: 12/24/2020 Level of Care: Telemetry Guideline Day 1 Level Of Care (X) ICU [D] or floor 12/27/2020 18:26:57 EST by Joshua Jacobson  
  
  telemetry Clinical Status  
  
(X) * Clinical Indications met [E]  
  
(X) Possible Fever, dyspnea, purulent sputum, pleuritic pain, Altered mental status 12/27/2020 18:26:57 EST by Joshua Jacobson  
  
  temp 101.6, worsening SOB Activity (X) Activity as tolerated 12/27/2020 18:26:57 EST by Joshua Jacobson  
  
  with assist  
  
Routes (X) Parenteral or oral medications 12/27/2020 18:26:57 EST by Joshua Jacobson  
  
  see meds below (X) Liquid or usual diet 12/27/2020 18:26:57 EST by Joshua Jacobson  
  
  regular diet Interventions (X) Possible ABG or oximetry (X) Blood culture 12/27/2020 18:26:57 EST by Joshua Jacobson Blood cultures: NO GROWTH 4 DAYS  
  
(X) Probable oxygen 12/27/2020 18:26:57 EST by Joshua Jacobson 2L NC  
  
* Milestone Additional Notes 12/24/20 Qian Machado is a 54 y.o. male with worsening shortness of breath today in the setting of a COVID-19 infection. He started having symptoms initially on 12/18. He got the infection from his coworker who was asymptomatic. He has had cough, fever, and abdominal discomfort. Vitals: 101.6, 113, 28, 135/93, 89% (placed on 2L NC) Meds: tylenol 650mg PO X2, vitamin C 500mg PO BID, ASA 81mg PO daily, vitamin D3 6000 units PO daily, decadron 6mg PO daily, lovenox 30mg SC Q12h, pepcid 20mg PO daily, remdesivir 200mg IV X1, zincate 220mg PO Q12h Labs: fibrinogen 481, glucose 103, Cr 1.35, gfr 55, AST 41 Blood cultures: NO GROWTH 4 DAYS CXR: Nonspecific small focus of airspace disease in the right lower lobe. Abg: pH 7.38, pCo2 38.8, pO2 74, hco3 22.6, O2 92, Plan: remdesivir, regular diet, activity as tolerated with assist, droplet plus, prone positioning, supplemental O2, prone positioning, transfused convalescent plasma PE:  
General: Awake, ill-appearing, mildly tachypneic, cooperative. Head: Normocephalic, without obvious abnormality, atraumatic. Eyes: Conjunctivae/corneas clear, sclera anicteric. Neck: Supple, symmetrical, trachea midline. Lungs:   Coarse breath sounds in right middle lung, no wheezing. Heart: Regular rate and rhythm, S1, S2 normal, no murmur, click, rub or gallop. Abdomen: Soft, non-tender. Bowel sounds normal. No masses,  No organomegaly. Extremities: Extremities normal, atraumatic, no cyanosis or edema. Capillary refill normal.  
Pulses: 2+ and symmetric all extremities. Skin: Skin color pink, turgor normal. No rashes or lesions Neurologic: No focal motor or sensory deficit. IM:  
55 y/o male with obesity admitted for COVID-19 infection with hypoxia. COVID-19 infection  
-admit to isolation unit  
-nasal cannula oxygen  
-prone positioning as tolerated  
-dexamethasone  
-antioxidant cocktail  
-daily ASA  
-daily COVID labs  
-convalescent plasma (consent signed) -ID consult for consideration of remdesivir Obesity-comorbid condition increasing hospitalization rate with COVID infection  
-aggressive lifestyle intervention needed  
-follow up hemoglobin A1C to screen for diabetes Full code Prophylaxis: pepcid PO, lovenox SQ  
 
  
ID:  
Given ethnicity/obesity and oxygen requirements, he is high risk for progression to intubation/death. Agree with remdesivir. Pneumonia - Clinical Indications for Admission to Inpatient Care by Ariel Bautista RN Review Entered Review Status 12/27/2020 18:25 Completed Criteria Review Clinical Indications for Admission to Inpatient Care Most Recent : Alycia Rogers Most Recent Date: 12/27/2020 18:25:21 EST  
  
(X) Admission is indicated for  1 or more  of the following  [A] [B] (1) (2) (5) (6) (7): (X) Hypoxemia 12/27/2020 18:25:21 EST by Alycia Rogers 89%